# Patient Record
Sex: FEMALE | Race: BLACK OR AFRICAN AMERICAN | NOT HISPANIC OR LATINO | Employment: UNEMPLOYED | URBAN - METROPOLITAN AREA
[De-identification: names, ages, dates, MRNs, and addresses within clinical notes are randomized per-mention and may not be internally consistent; named-entity substitution may affect disease eponyms.]

---

## 2017-02-20 DIAGNOSIS — M54.50 CHRONIC LEFT-SIDED LOW BACK PAIN WITHOUT SCIATICA: ICD-10-CM

## 2017-02-20 DIAGNOSIS — G89.29 CHRONIC LEFT-SIDED LOW BACK PAIN WITHOUT SCIATICA: ICD-10-CM

## 2017-02-21 RX ORDER — BACLOFEN 10 MG/1
5-10 TABLET ORAL 3 TIMES DAILY
Qty: 90 TABLET | Refills: 1 | Status: SHIPPED | OUTPATIENT
Start: 2017-02-21 | End: 2017-03-22

## 2017-03-22 DIAGNOSIS — G89.29 CHRONIC LEFT-SIDED LOW BACK PAIN WITHOUT SCIATICA: ICD-10-CM

## 2017-03-22 DIAGNOSIS — M54.50 CHRONIC LEFT-SIDED LOW BACK PAIN WITHOUT SCIATICA: ICD-10-CM

## 2017-03-22 RX ORDER — BACLOFEN 10 MG/1
5-10 TABLET ORAL 3 TIMES DAILY
Qty: 90 TABLET | Refills: 1 | Status: SHIPPED | OUTPATIENT
Start: 2017-03-22 | End: 2020-01-14

## 2017-03-31 ENCOUNTER — TELEPHONE (OUTPATIENT)
Dept: FAMILY MEDICINE | Facility: CLINIC | Age: 67
End: 2017-03-31

## 2017-03-31 NOTE — TELEPHONE ENCOUNTER
[facility] phone call message- general phone call:    Reason for call: Pt stopped by she stated that her  sent over some faxes to her Dr and has not heard anything back yet need a someone to call her back the  is from [facility] her Name is Paula Saenz at  pt is facing eviction and needs paperwork or statement from Dr please call pt with any questions     Return call needed: Yes    OK to leave a message on voice mail? Yes    Primary language: English      needed? No    Call taken on March 31, 2017 at 12:00 PM by Nimisha De La Garza mp

## 2017-04-06 ENCOUNTER — OFFICE VISIT (OUTPATIENT)
Dept: FAMILY MEDICINE | Facility: CLINIC | Age: 67
End: 2017-04-06

## 2017-04-06 VITALS
WEIGHT: 193.2 LBS | TEMPERATURE: 98.2 F | SYSTOLIC BLOOD PRESSURE: 139 MMHG | DIASTOLIC BLOOD PRESSURE: 82 MMHG | HEART RATE: 76 BPM | BODY MASS INDEX: 30.26 KG/M2

## 2017-04-06 DIAGNOSIS — Z59.9 HOUSING OR ECONOMIC CIRCUMSTANCE: Primary | ICD-10-CM

## 2017-04-06 SDOH — ECONOMIC STABILITY - INCOME SECURITY: PROBLEM RELATED TO HOUSING AND ECONOMIC CIRCUMSTANCES, UNSPECIFIED: Z59.9

## 2017-04-06 NOTE — NURSING NOTE
Form Documentation (See below for information on what forms need appointments)    Information to be gathered from patient at time form was received:    Date form was received: 4/6/2017  : [place]  Type of form: Other: [employer] Payee  Who needs to complete form: Dr. Lewis  Preferred due date: today  Do we have an up to date number to contact the patient? Yes  Is there anyone else who we can contact with questions about the form? Yes  Is patient's portion filled out, including signature? YES  What should be done once the form is completed? Other: Give to patient today.     Message to be conveyed to patient: It may take your provider up to 10 days to complete your form. You may contact the clinic if you have concerns after 10 days.  _________________________________________________________________    Clinic process documentation (Please update this documentation at each handoff and date each update, i.e.: 6/3/16-Requested records from [facility]. Will complete form once these are received. TW):    Current status of form: Complete  Date form completed/sent: 04/06/2017  Was completed form routed to medical records? Yes:  Date 04/06/2017.    Status update: Complete  _________________________________________________________________    What forms need appointments?  If you are not sure whether or not an appointment is needed, consult with triage.    Forms that need a visit:  Citizenship (N-[id number])  FMLA (No visit needed if there was a visit within the last month for the leave reason)  Medical Opinion  [employer]  Social Security    No visit needed if there has been a physical within the last year:  (Forms do not need to be filled out by a doctor)    School Physical    May not need a visit (per providers discretion):  Handicap Parking  Transportation Evaluation  WIC (occasionally a visit may be needed)    Triage  [employer]

## 2017-04-06 NOTE — MR AVS SNAPSHOT
After Visit Summary   2017    Demetra Amin    MRN: [id number]           Patient Information     Date Of Birth          1950        Visit Information        Provider Department      2017 11:20 AM Ward Lewis DO [facility]        Today's Diagnoses     Housing or economic circumstance    -  1       Follow-ups after your visit        Follow-up notes from your care team     Return in about 1 year (around 2018) for Routine Visit.      Who to contact     Please call your clinic at [phone] to:    Ask questions about your health    Make or cancel appointments    Discuss your medicines    Learn about your test results    Speak to your doctor   If you have compliments or concerns about an experience at your clinic, or if you wish to file a complaint, please contact [facility] Patient Relations at [phone] or email us at [email or web address]         Additional Information About Your Visit        MyChart Information     [employer] is an electronic gateway that provides easy, online access to your medical records. With [employer], you can request a clinic appointment, read your test results, renew a prescription or communicate with your care team.     To sign up for [employer]ixt visit the website at [email or web address]   You will be asked to enter the access code listed below, as well as some personal information. Please follow the directions to create your username and password.     Your access code is: [email or web address]  Expires: 2017  7:15 AM     Your access code will  in 90 days. If you need help or a new code, please contact your [facility] or call [phone] for assistance.        Care EveryWhere ID     This is your Care EveryWhere ID. This could be used by other organizations to access your [place] medical records  [id number]        Your Vitals Were     Pulse Temperature BMI (Body Mass  Index)             76 98.2  F (36.8  C) (Oral) 30.26 kg/m2          Blood Pressure from Last 3 Encounters:   04/06/17 139/82   10/03/16 136/83   08/23/16 138/83    Weight from Last 3 Encounters:   04/06/17 193 lb 3.2 oz (87.6 kg)   10/03/16 195 lb 9.6 oz (88.7 kg)   08/23/16 186 lb 9.6 oz (84.6 kg)              Today, you had the following     No orders found for display       Primary Care Provider Office Phone # Fax #    Doe Jackson [phone]       [address] [address]        Thank you!     Thank you for choosing [facility]  for your care. Our goal is always to provide you with excellent care. Hearing back from our patients is one way we can continue to improve our services. Please take a few minutes to complete the written survey that you may receive in the mail after your visit with us. Thank you!             Your Updated Medication List - Protect others around you: Learn how to safely use, store and throw away your medicines at www.disposemymeds.org.          This list is accurate as of: 4/6/17 11:59 PM.  Always use your most recent med list.                   Brand Name Dispense Instructions for use    acetaminophen 500 MG tablet    TYLENOL    100 tablet    Take 1-2 tablets (500-1,000 mg) by mouth 3 times daily as needed for pain Do not take more than 6 tablets in a day.  Do not take with other medications than contain acetaminophen       albuterol 108 (90 BASE) MCG/ACT Inhaler    PROAIR HFA/PROVENTIL HFA/VENTOLIN HFA    1 Inhaler    Inhale 2 puffs into the lungs every 6 hours as needed for shortness of breath / dyspnea or wheezing       alum & mag hydroxide-simethicone 400-400-40 MG/5ML Susp suspension    MAALOX ADVANCED MAX ST    1 Bottle    Take 30 mLs by mouth every 4 hours as needed for indigestion       baclofen 10 MG tablet    LIORESAL    90 tablet    Take 0.5-1 tablets (5-10 mg) by mouth 3 times daily       calcium carbonate 500 MG chewable  tablet    TUMS    90 chew tab    Take 1 tablet (500 mg) by mouth daily       DAILY MULTIPLE VITAMINS Tabs     100 tablet    Take 1 tablet by mouth daily       lisinopril-hydrochlorothiazide 20-12.5 MG per tablet    PRINZIDE/ZESTORETIC    180 tablet    Take 2 tablets by mouth daily       methadone 10 MG/ML (HIGH CONC) solution    DOLOPHINE-INTENSOL     Dosed by methadone clinic       psyllium 63 % Powd    METAMUCIL SMOOTH TEXTURE    1 Bottle    Take 3 teaspoonful by mouth 3 times daily Mix in 8 ounces of water       ranitidine 150 MG tablet    ZANTAC    180 tablet    Take 1 tablet (150 mg) by mouth 2 times daily as needed for heartburn       salmeterol 50 MCG/DOSE diskus inhaler    SEREVENT DISKUS    3 Inhaler    Inhale 1 puff (50 mcg) into the lungs 2 times daily

## 2017-04-06 NOTE — PROGRESS NOTES
Subjective:  Demetra Amin is a 66 year old female with a history of     Patient Active Problem List    Diagnosis Date Noted     S/P total hip arthroplasty 02/17/2015     Priority: Medium     History of alcohol abuse 08/23/2016     S/p treatment in July 2016       Polysubstance abuse 03/27/2014 5/2013 UDS positive for cocaine, THC, methadone    3/31/15: Received [facility] notice of pt receiving opioid analgesics from 5+ physicians. Has a history of polysubstance abuse. Will scan prescription record into her chart, be aware of this when prescribing medication for pain in the future.         Chronic hepatitis C (H) 05/31/2013     Emeli type 1b in 2008       Essential hypertension 11/19/2012     human herpesvirus infection 11/19/2012     Primary localized osteoarthrosis, pelvic region and thigh 11/19/2012     Health Care Home 11/19/2012     Tier Level: 1    DX V65.8 REPLACED WITH 72723 HEALTH CARE HOME (04/08/2013)       States that she is currently on public housing. Is currently being seen by a  and needs to have a form completed that she has the ability to manage her own benefits.     She has help from her daughter who helps her to manager he bills. Daughter is her payee currently and  states that she should be transitioned to a state payee in order to not be evicted from her current housing location.     Past use of IV drugs but endorses that she has been on methadone for the past 15 years.      She manages all of her own medications while at home.     ROS: No fever or chills. No nausea of vomiting.     Objective:  Vitals: /82 (BP Location: Left arm, Patient Position: Chair, Cuff Size: Adult Large)  Pulse 76  Temp 98.2  F (36.8  C) (Oral)  Wt 193 lb 3.2 oz (87.6 kg)  BMI 30.26 kg/m2  General: Well-nourished. Alert and cooperative. No apparent distress.  Cardiovascular: Regular rate and rhythm. Normal S1 and S2. No murmurs  Respiratory: Clear to auscultation bilaterally. No  crackles or wheezes.   Psychiatric: Oriented to person, place, and time. Appropriate mood and affect.  Mentation appears normal.    Assessment:  Demetra Amin is a 66 year old female seen today for form completion.       Plan:    Housing or economic circumstance: Forms were completed in clinic today a copy of this will be placed in her chart. She was also sent home with a copy of her letter from Dr. Jackson.     Patient was discussed with and seen by Dr. Oropeza.    Ward Lewis PGY3

## 2017-04-06 NOTE — PROGRESS NOTES
Preceptor attestation:  Patient seen and discussed with the resident. Assessment and plan reviewed with resident and agreed upon.  Supervising physician: Olman Oropeza  [facility]

## 2017-04-14 ENCOUNTER — OFFICE VISIT (OUTPATIENT)
Dept: FAMILY MEDICINE | Facility: CLINIC | Age: 67
End: 2017-04-14

## 2017-04-14 VITALS
WEIGHT: 194.2 LBS | BODY MASS INDEX: 30.48 KG/M2 | TEMPERATURE: 98.2 F | SYSTOLIC BLOOD PRESSURE: 160 MMHG | DIASTOLIC BLOOD PRESSURE: 98 MMHG | HEART RATE: 84 BPM | HEIGHT: 67 IN

## 2017-04-14 DIAGNOSIS — M15.0 PRIMARY OSTEOARTHRITIS INVOLVING MULTIPLE JOINTS: Primary | ICD-10-CM

## 2017-04-14 NOTE — PROGRESS NOTES
"There are no exam notes on file for this visit.  Chief Complaint   Patient presents with     Forms     transportation forms     Blood pressure (!) 160/98, pulse 84, temperature 98.2  F (36.8  C), temperature source Oral, height 5' 7\" (170.2 cm), weight 194 lb 3.2 oz (88.1 kg), not currently breastfeeding.  SUBJECTIVE:  Patient came in today for me to complete a form for transportation evaluation.     Patient stated that she otherwise is doing well and she had no other concerns or complaints.     Patient and I discussed her need for specialized transportation.  Patient told me that she had OA of multiple sites, including the hips and knees.  Although she had replacement of several of these joints, they still swelled and were quite painful.  Additionally, she had lower back pain which caused her to have quite a bit of difficulty with walking.     She hadn't had recent hospitalization.  She lives alone.  She didn't have steps at her residence and instead has an elevator.  Patient told me that she cam only ambulate at maximum one-half block.  She uses a cane for ambulation.  She notes that she gets increased pain with cold weather and that she had increased pain over the past year.  She told me that she doesn't have problems with cognitive abilities.  She told me that she is able to remove herself from an unsafe situation, she is able to communicate her needs well, and she doesn't have problems with vision or hearing.     Patient's form was completed for her with these answers; please see scanned copy of the form for further details.   This was a 16-minute visit, over half of which was spent in counseling and coordination of care, and completing the transportation evaluation form.     Patient's blood pressure was elevated today.  She told me that she believed this was due to the stress of having this form completed, and she wasn't interested in looking into this further. We discussed the importance of treating " hypertension and the consequences of untreated htn.    Primary osteoarthritis involving multiple joints  The patient was actively involved in the decision making process, and all the questions were answered to their satisfaction prior to leaving.

## 2017-04-14 NOTE — MR AVS SNAPSHOT
"              After Visit Summary   2017    Demetra Amin    MRN: [id number]           Patient Information     Date Of Birth          1950        Visit Information        Provider Department      2017 10:40 AM Doe Jackson MD [facility]        Today's Diagnoses     Primary osteoarthritis involving multiple joints    -  1       Follow-ups after your visit        Who to contact     Please call your clinic at [phone] to:    Ask questions about your health    Make or cancel appointments    Discuss your medicines    Learn about your test results    Speak to your doctor   If you have compliments or concerns about an experience at your clinic, or if you wish to file a complaint, please contact [facility] Patient Relations at [phone] or email us at [email or web address]         Additional Information About Your Visit        MyChart Information     [employer] is an electronic gateway that provides easy, online access to your medical records. With [employer], you can request a clinic appointment, read your test results, renew a prescription or communicate with your care team.     To sign up for [employer] visit the website at [email or web address]   You will be asked to enter the access code listed below, as well as some personal information. Please follow the directions to create your username and password.     Your access code is: [email or web address]  Expires: 2017  7:15 AM     Your access code will  in 90 days. If you need help or a new code, please contact your [facility] or call [phone] for assistance.        Care EveryWhere ID     This is your Care EveryWhere ID. This could be used by other organizations to access your [place] medical records  [id number]        Your Vitals Were     Pulse Temperature Height BMI (Body Mass Index)          84 98.2  F (36.8  C) (Oral) 5' 7\" (170.2 cm) 30.42 kg/m2         " Blood Pressure from Last 3 Encounters:   04/14/17 (!) 160/98   04/06/17 139/82   10/03/16 136/83    Weight from Last 3 Encounters:   04/14/17 194 lb 3.2 oz (88.1 kg)   04/06/17 193 lb 3.2 oz (87.6 kg)   10/03/16 195 lb 9.6 oz (88.7 kg)              Today, you had the following     No orders found for display       Primary Care Provider Office Phone # Fax #    Doe Jackson [phone]       [address] [address]        Thank you!     Thank you for choosing [facility]  for your care. Our goal is always to provide you with excellent care. Hearing back from our patients is one way we can continue to improve our services. Please take a few minutes to complete the written survey that you may receive in the mail after your visit with us. Thank you!             Your Updated Medication List - Protect others around you: Learn how to safely use, store and throw away your medicines at www.disposemymeds.org.          This list is accurate as of: 4/14/17 11:59 PM.  Always use your most recent med list.                   Brand Name Dispense Instructions for use    acetaminophen 500 MG tablet    TYLENOL    100 tablet    Take 1-2 tablets (500-1,000 mg) by mouth 3 times daily as needed for pain Do not take more than 6 tablets in a day.  Do not take with other medications than contain acetaminophen       albuterol 108 (90 BASE) MCG/ACT Inhaler    PROAIR HFA/PROVENTIL HFA/VENTOLIN HFA    1 Inhaler    Inhale 2 puffs into the lungs every 6 hours as needed for shortness of breath / dyspnea or wheezing       alum & mag hydroxide-simethicone 400-400-40 MG/5ML Susp suspension    MAALOX ADVANCED MAX ST    1 Bottle    Take 30 mLs by mouth every 4 hours as needed for indigestion       baclofen 10 MG tablet    LIORESAL    90 tablet    Take 0.5-1 tablets (5-10 mg) by mouth 3 times daily       calcium carbonate 500 MG chewable tablet    TUMS    90 chew tab    Take 1 tablet (500 mg) by  mouth daily       DAILY MULTIPLE VITAMINS Tabs     100 tablet    Take 1 tablet by mouth daily       lisinopril-hydrochlorothiazide 20-12.5 MG per tablet    PRINZIDE/ZESTORETIC    180 tablet    Take 2 tablets by mouth daily       methadone 10 MG/ML (HIGH CONC) solution    DOLOPHINE-INTENSOL     Dosed by methadone clinic       psyllium 63 % Powd    METAMUCIL SMOOTH TEXTURE    1 Bottle    Take 3 teaspoonful by mouth 3 times daily Mix in 8 ounces of water       ranitidine 150 MG tablet    ZANTAC    180 tablet    Take 1 tablet (150 mg) by mouth 2 times daily as needed for heartburn       salmeterol 50 MCG/DOSE diskus inhaler    SEREVENT DISKUS    3 Inhaler    Inhale 1 puff (50 mcg) into the lungs 2 times daily

## 2017-04-21 ENCOUNTER — TELEPHONE (OUTPATIENT)
Dept: FAMILY MEDICINE | Facility: CLINIC | Age: 67
End: 2017-04-21

## 2017-04-21 NOTE — TELEPHONE ENCOUNTER
I have not done any neuro testing on this patient so would not be able to speak to her ability to manage her own finances. Sadi Lewis

## 2017-04-21 NOTE — TELEPHONE ENCOUNTER
[facility] phone call message- general phone call:    Reason for call: Has questions regarding neurological testing and patient.    Return call needed: Yes    OK to leave a message on voice mail? Yes    Primary language: English      needed? No    Call taken on April 21, 2017 at 1:37 PM by Maribel Miller

## 2017-04-21 NOTE — TELEPHONE ENCOUNTER
Gudelia, , calling to get more info from Dr. Lewis. Pt is a defendant in an eviction process    Dr. Lweis, based upon knowledge of neuro testing, would you say pt has a disability or impairment that would effect  her ability to manage her finances or pay her rent on time?    Gudelia's cell:  [phone]. Gudelia has a mtg at 3:30 but it's okay to leave vm (it's confidential vm)     Routed to Dr. Lewis. /TOMAS George

## 2017-08-15 DIAGNOSIS — I10 ESSENTIAL HYPERTENSION, BENIGN: ICD-10-CM

## 2017-08-15 RX ORDER — LISINOPRIL AND HYDROCHLOROTHIAZIDE 12.5; 2 MG/1; MG/1
2 TABLET ORAL DAILY
Qty: 180 TABLET | Refills: 3 | Status: SHIPPED | OUTPATIENT
Start: 2017-08-15 | End: 2018-06-13

## 2018-03-05 ENCOUNTER — OFFICE VISIT (OUTPATIENT)
Dept: FAMILY MEDICINE | Facility: CLINIC | Age: 68
End: 2018-03-05
Payer: MEDICARE

## 2018-03-05 ENCOUNTER — RECORDS - HEALTHEAST (OUTPATIENT)
Dept: ADMINISTRATIVE | Facility: OTHER | Age: 68
End: 2018-03-05

## 2018-03-05 VITALS
HEIGHT: 66 IN | SYSTOLIC BLOOD PRESSURE: 190 MMHG | HEART RATE: 64 BPM | OXYGEN SATURATION: 97 % | WEIGHT: 177.8 LBS | BODY MASS INDEX: 28.57 KG/M2 | DIASTOLIC BLOOD PRESSURE: 95 MMHG | TEMPERATURE: 98.8 F

## 2018-03-05 DIAGNOSIS — B35.1 ONYCHOMYCOSIS: ICD-10-CM

## 2018-03-05 DIAGNOSIS — M54.50 CHRONIC BILATERAL LOW BACK PAIN WITHOUT SCIATICA: Primary | ICD-10-CM

## 2018-03-05 DIAGNOSIS — B18.2 CHRONIC HEPATITIS C WITHOUT HEPATIC COMA (H): ICD-10-CM

## 2018-03-05 DIAGNOSIS — Z12.31 ENCOUNTER FOR SCREENING MAMMOGRAM FOR BREAST CANCER: ICD-10-CM

## 2018-03-05 DIAGNOSIS — Z23 NEED FOR VACCINATION: ICD-10-CM

## 2018-03-05 DIAGNOSIS — G89.29 CHRONIC BILATERAL LOW BACK PAIN WITHOUT SCIATICA: Primary | ICD-10-CM

## 2018-03-05 DIAGNOSIS — I10 ESSENTIAL HYPERTENSION, BENIGN: ICD-10-CM

## 2018-03-05 LAB
ALBUMIN SERPL-MCNC: 4 MG/DL (ref 3.3–4.9)
ALP SERPL-CCNC: 87.8 U/L (ref 40–150)
ALT SERPL-CCNC: 17 U/L (ref 0–45)
AST SERPL-CCNC: 20.4 U/L (ref 0–45)
BASOPHILS # BLD AUTO: 0 THOU/UL (ref 0–0.2)
BASOPHILS NFR BLD AUTO: 0 % (ref 0–2)
BILIRUB SERPL-MCNC: 0.4 MG/DL (ref 0.2–1.3)
BUN SERPL-MCNC: 15.8 MG/DL (ref 7–19)
CALCIUM SERPL-MCNC: 9.8 MG/DL (ref 8.5–10.1)
CHLORIDE SERPLBLD-SCNC: 103.3 MMOL/L (ref 98–110)
CO2 SERPL-SCNC: 29 MMOL/L (ref 20–32)
CREAT SERPL-MCNC: 0.8 MG/DL (ref 0.5–1)
EOSINOPHIL # BLD AUTO: 0.1 THOU/UL (ref 0–0.4)
EOSINOPHIL NFR BLD AUTO: 1 % (ref 0–6)
ERYTHROCYTE [DISTWIDTH] IN BLOOD BY AUTOMATED COUNT: 13.1 % (ref 11–14.5)
GFR SERPL CREATININE-BSD FRML MDRD: 76.5 ML/MIN/1.7 M2
GLUCOSE SERPL-MCNC: 88.1 MG'DL (ref 70–99)
HCT VFR BLD AUTO: 42.2 % (ref 35–47)
HGB BLD-MCNC: 13 G/DL (ref 12–16)
LYMPHOCYTES # BLD AUTO: 1.9 THOU/UL (ref 0.8–4.4)
LYMPHOCYTES NFR BLD AUTO: 28 % (ref 20–40)
MCH RBC QN AUTO: 28.4 PG (ref 27–34)
MCHC RBC AUTO-ENTMCNC: 30.8 G/DL (ref 32–36)
MCV RBC AUTO: 92 FL (ref 80–100)
MONOCYTES # BLD AUTO: 0.5 THOU/UL (ref 0–0.9)
MONOCYTES NFR BLD AUTO: 7 % (ref 2–10)
NEUTROPHILS # BLD AUTO: 4.2 THOU/UL (ref 2–7.7)
NEUTROPHILS NFR BLD AUTO: 64 % (ref 50–70)
PLATELET # BLD AUTO: 194 THOU/UL (ref 140–440)
PMV BLD AUTO: 12.7 FL (ref 8.5–12.5)
POTASSIUM SERPL-SCNC: 4.8 MMOL/DL (ref 3.2–4.6)
PROT SERPL-MCNC: 7.5 G/DL (ref 6.8–8.8)
RBC # BLD AUTO: 4.58 MILL/UL (ref 3.8–5.4)
SODIUM SERPL-SCNC: 137.8 MMOL/L (ref 132–142)
WBC # BLD AUTO: 6.7 THOU/UL (ref 4–11)

## 2018-03-05 NOTE — PATIENT INSTRUCTIONS
Thank you for coming to [facility].  **If you had lab testing today and your results are reassuring or normal they will be be mailed to you within 7 days.   **If the lab tests need quick action we will call you with the results.  The phone number we will call with results is # [phone] (home) . If this is not the best number please call our clinic and change the number.  If you need any refills please call your pharmacy and they will contact us.  If you have any concerns about today's visit or wish to schedule another appointment please call our office during normal business hours [phone] (8-5:00 M-F)  If you have urgent medical concerns please call [phone] at any time of the day.  If you a medical emergency please call [phone]  Again thank you for choosing [facility] and please let us know how we can best partner with you to improve you and your family's health.          [facility]  Radiology Department 1st floor  [address] [id number] [phone]    Appointment for Mammogram and Ultasound  Date:3/8/18  Time:8:45am arrival    The day of your mammogram please refrain from using lotions, powders, or perfumes in the area as this could interfere with the imaging.     *Nothing to eat or drink for 8 hours prior to ultrasound.     [facility]    [address] [place] [address]  Hours: Open   Closes 5PM  Phone: [phone]    Appointment  Date: 4/5/18  Time: 8:20am   Dr. Beebe       Please contact the above clinic if you need to cancel or reschedule. Feel free to contact me with any questions. Thanks!    Griselda  Care Coordinator  [phone]        "[facility]   [address] [place] [address]  Hours: Open   Closes 5PM  Phone: [phone]  *Go to [employer] to get your cane. Bring your prescription with you.     GASTROENOLOGY:  Orders and demographics faxed to BENJ[facility] Orosco and they will contact you for scheduling  within 2-5 business days.   PH:  [phone]   FAX:  [phone]  Viky Pack  3/8/18

## 2018-03-05 NOTE — LETTER
March 5, 2018      Demetra LEAHY [facility]  [address]   [place] [address]        Dear Demetra,  Your liver and kidney tests are good.  Your kidney tests have improved since one year ago   Please see below for your test results.    Resulted Orders   Comprehensive Metabolic Panel ([place])   Result Value Ref Range    Albumin 4.0 3.3 - 4.9 mg/dL    Alkaline Phosphatase 87.8 40.0 - 150.0 U/L    ALT 17.0 0.0 - 45.0 U/L    AST 20.4 0.0 - 45.0 U/L    Bilirubin Total 0.4 0.2 - 1.3 mg/dL    Urea Nitrogen 15.8 7.0 - 19.0 mg/dL    Calcium 9.8 8.5 - 10.1 mg/dL    Chloride 103.3 98.0 - 110.0 mmol/L    Carbon Dioxide 29.0 20.0 - 32.0 mmol/L    Creatinine 0.8 0.5 - 1.0 mg/dL    Glucose 88.1 70.0 - 99.0 mg'dL    Potassium 4.8 (H) 3.2 - 4.6 mmol/dL    Sodium 137.8 132.0 - 142.0 mmol/L    Protein Total 7.5 6.8 - 8.8 g/dL    GFR Estimate 76.5 >60.0 mL/min/1.7 m2    GFR Estimate If Black >90 >60.0 mL/min/1.7 m2       If you have any questions, please call the clinic to make an appointment.    Sincerely,    Doe Jackson MD

## 2018-03-05 NOTE — PROGRESS NOTES
"There are no exam notes on file for this visit.  Chief Complaint   Patient presents with     Back Pain     come here today for back pain and pain medication per patient.      Referral     want referral for mammogram per patient.      Blood pressure (!) 190/95, pulse 64, temperature 98.8  F (37.1  C), temperature source Oral, height 5' 6\" (167.6 cm), weight 177 lb 12.8 oz (80.6 kg), SpO2 97 %, not currently breastfeeding.    Patient comes in today with several assorted complaints.    The patient tells me that her daughter, who lives in [place], was recently hospitalized.  She told me that she received a call on 2017 to help identify her daughter.  She did go to [place] to be with her daughter and tells me that seeing her daughter with all the medical tubes and machines was really quite traumatic for her.  Her daughter is 44 years old.  The daughter apparently has survived, and is now discharged from the hospital.  The patient tells me that she has been back from [place] over the past 1-2 weeks.    The patient notes that she has quite a bit of low back pain.  She is not able to walk 2 blocks to the bus stop.  She notes that as she walks this distance she has to stop several times due to the pain.  She also notes that she has anxiety and depression.    The patient reminds me that she has had quite a bit of osteoarthritis and has had both knees and hips replaced.    The patient tells me that she \"thinks too much\" and that she has some difficulty sleeping.  She does not feel that she can relax.     The patient tells me that she usually \"likes to handle things myself\".    The patient also tells me that her daughter's mother-in-law recently  of cancer of the brain after being on hospice and this has been quite stressful for the family to    The patient notes that she used to be on 160 mg of methadone.  Because she missed some time due to her daughter's illness they decreased her down significantly, and she " is now titrating up by 10 mg every 3 days and is currently up to 82 mg per day.    I would like the patient see a counselor.  The patient tells me that there is a counselor at the methadone clinic and that she has a good relationship with that person and she does not want to see anybody else.    The patient notes that she is unable to stay asleep after awakening early in the morning.  She attributes this to withdrawal symptoms.    The patient tells me she did not take her blood pressure medication this morning.  She has plenty of the blood pressure medicine at home, she just forgot to take it this morning.  She had tells me that she has over 2 bottles of blood pressure medicine at home.    Objective: This is a well-nourished, well-developed, female who is in no acute distress.  Her vital signs are as noted above and reveal a very elevated blood pressure.  Examination of her feet show changes consistent with onychomycosis across the nails of both of her feet.  She would like to see a podiatrist.    Assessment: #1 chronic bilateral low back pain without sciatica #2 need for screening mammography #3 onychomycosis #4 essential hypertension under very poor control #5 chronic hepatitis C    Plan: It looks to me that the patient has not had lab work for hepatitis C recently.  She agrees to have lab work today.  Therefore we will check a RNA quantitative.  We will also check an ultrasound to screen for HCC.  Will check a CBC with platelets for cirrhosis.  If the patient has a significant titer of hepatitis C virus, will send the patient to discuss therapy at gastroenterology.    I am happy to provide a referral for podiatry.    I would like the patient follow-up with me in 1 week to recheck her blood pressure pressure after she has been back on her medication.  She agrees to do so.  Please see orders below.    I discussed with the patient that I think that some part of her back discomfort could be that she has not titrated  back up to her usual dose of methadone.  We will watch and see how her pain does as she titrates back up on the methadone.  She would like a cane, I am happy to provide this for her.      Chronic bilateral low back pain without sciatica  -     order for DME; Equipment being ordered: Cane    Encounter for screening mammogram for breast cancer  -     PCS Use: Screening Digital Bilateral Mammogram; Future    Onychomycosis  -     PODIATRY/FOOT & ANKLE SURGERY REFERRAL; Future    Essential hypertension    Chronic hepatitis C without hepatic coma (H)  -     Cancel: Hepatitis C RNA quantitative  -     Comprehensive Metabolic Panel ([place])  -     US ABDOMEN COMPLETE; Future  -     CBC w/ Diff and Plt ([facility])  -     Hepatits C RNA by RT-PCR ([facility])    Need for vaccination  -     ADMIN VACCINE, INITIAL  -     Pneumococcal vaccine 13 valent PCV13 IM (Prevnar) [[id number]]    We spent 31 minutes during this visit face to face.  Over half of this time was spent in counseling and coordination of care with discussion of the above items.    The patient was actively involved in the decision making process, and all the questions were answered to their satisfaction prior to leaving.

## 2018-03-05 NOTE — LETTER
March 6, 2018      Demetra L [facility]  [address]   [place] [address]        Dear Demetra,    Please see below for your test results.   Your complete blood count results are good     Resulted Orders   Comprehensive Metabolic Panel ([place])   Result Value Ref Range    Albumin 4.0 3.3 - 4.9 mg/dL    Alkaline Phosphatase 87.8 40.0 - 150.0 U/L    ALT 17.0 0.0 - 45.0 U/L    AST 20.4 0.0 - 45.0 U/L    Bilirubin Total 0.4 0.2 - 1.3 mg/dL    Urea Nitrogen 15.8 7.0 - 19.0 mg/dL    Calcium 9.8 8.5 - 10.1 mg/dL    Chloride 103.3 98.0 - 110.0 mmol/L    Carbon Dioxide 29.0 20.0 - 32.0 mmol/L    Creatinine 0.8 0.5 - 1.0 mg/dL    Glucose 88.1 70.0 - 99.0 mg'dL    Potassium 4.8 (H) 3.2 - 4.6 mmol/dL    Sodium 137.8 132.0 - 142.0 mmol/L    Protein Total 7.5 6.8 - 8.8 g/dL    GFR Estimate 76.5 >60.0 mL/min/1.7 m2    GFR Estimate If Black >90 >60.0 mL/min/1.7 m2   CBC w/ Diff and Plt ([facility])   Result Value Ref Range    WBC 6.7 4.0 - 11.0 thou/uL    RBC 4.58 3.80 - 5.40 mill/uL    Hemoglobin 13.0 12.0 - 16.0 g/dL    Hematocrit 42.2 35.0 - 47.0 %    MCV 92 80 - 100 fL    MCH 28.4 27.0 - 34.0 pg    MCHC 30.8 (L) 32.0 - 36.0 g/dL    RDW 13.1 11.0 - 14.5 %    Platelets 194 140 - 440 thou/uL    Mean Platelet Volume 12.7 (H) 8.5 - 12.5 fL    % Neutrophils 64 50 - 70 %    % Lymphocytes 28 20 - 40 %    % Monocytes 7 2 - 10 %    % Eosinophils 1 0 - 6 %    % Basophils 0 0 - 2 %    Neutrophils (Absolute) 4.2 2.0 - 7.7 thou/uL    Lymphs (Absolute) 1.9 0.8 - 4.4 thou/uL    Monocytes(Absolute) 0.5 0.0 - 0.9 thou/uL    Eos (Absolute) 0.1 0.0 - 0.4 thou/uL    Baso (Absolute) 0.0 0.0 - 0.2 thou/uL    Narrative    Test performed by:  [facility]  [address] [place] [address]       If you have any questions, please call the clinic to make an appointment.    Sincerely,    Doe Jackson MD

## 2018-03-05 NOTE — MR AVS SNAPSHOT
After Visit Summary   3/5/2018    Demetra Amin    MRN: [id number]           Patient Information     Date Of Birth          1950        Visit Information        Provider Department      3/5/2018 8:00 AM Doe Jackson MD [facility]        Today's Diagnoses     Encounter for screening mammogram for breast cancer    -  1    Onychomycosis        Essential hypertension        Chronic hepatitis C without hepatic coma (H)          Care Instructions    Thank you for coming to [facility].  **If you had lab testing today and your results are reassuring or normal they will be be mailed to you within 7 days.   **If the lab tests need quick action we will call you with the results.  The phone number we will call with results is # [phone] (home) . If this is not the best number please call our clinic and change the number.  If you need any refills please call your pharmacy and they will contact us.  If you have any concerns about today's visit or wish to schedule another appointment please call our office during normal business hours [phone] (8-5:00 M-F)  If you have urgent medical concerns please call [phone] at any time of the day.  If you a medical emergency please call [phone]  Again thank you for choosing [facility] and please let us know how we can best partner with you to improve you and your family's health.                    Follow-ups after your visit        Additional Services     PODIATRY/FOOT & ANKLE SURGERY REFERRAL       Patient to stop at the [facility] Desk    Reason for Referral: Onychomycosis     needed: No  Language: English    May leave message on voicemail: Yes    ([place] Only) Referral should be tracked (Yes/No)?                  Future tests that were ordered for you today     Open Future Orders        Priority Expected Expires Ordered    US ABDOMEN COMPLETE Routine  3/5/2019 3/5/2018    PODIATRY/FOOT & ANKLE SURGERY REFERRAL Routine  3/5/2019  "3/5/2018    PCS Use: Screening Digital Bilateral Mammogram Routine  3/5/2019 3/5/2018            Who to contact     Please call your clinic at [phone] to:    Ask questions about your health    Make or cancel appointments    Discuss your medicines    Learn about your test results    Speak to your doctor            Additional Information About Your Visit        MyChart Information     [employer] is an electronic gateway that provides easy, online access to your medical records. With [employer], you can request a clinic appointment, read your test results, renew a prescription or communicate with your care team.     To sign up for [employer] visit the website at [email or web address]   You will be asked to enter the access code listed below, as well as some personal information. Please follow the directions to create your username and password.     Your access code is: [email or web address]  Expires: 6/3/2018  8:32 AM     Your access code will  in 90 days. If you need help or a new code, please contact your [facility] or call [phone] for assistance.        Care EveryWhere ID     This is your Care EveryWhere ID. This could be used by other organizations to access your [facility] medical records  [id number]        Your Vitals Were     Pulse Temperature Height Pulse Oximetry BMI (Body Mass Index)       64 98.8  F (37.1  C) (Oral) 5' 6\" (167.6 cm) 97% 28.7 kg/m2        Blood Pressure from Last 3 Encounters:   18 (!) 190/95   17 (!) 160/98   17 139/82    Weight from Last 3 Encounters:   18 177 lb 12.8 oz (80.6 kg)   17 194 lb 3.2 oz (88.1 kg)   17 193 lb 3.2 oz (87.6 kg)              We Performed the Following     CBC w/ Diff and Plt ([facility])     Comprehensive Metabolic Panel ([place])     Hepatits C RNA by RT-PCR ([facility])        Primary Care Provider Office Phone # Fax #    Doe Jackson [phone]       [facility] " [address] [address]        Equal Access to Services     CATRACHITA CHAVEZ : Hadii aad ku hadnatashatrevor Sorubenali, waaxda luqadaha, qaybta kamonikacamille darnell, michelle samuelsthaodonis ogden. So [facility] [phone].    ATENCIÓN: Si habla español, tiene a castellon disposición servicios gratuitos de asistencia lingüística. Meliaame al [phone].    We comply with applicable federal civil rights laws and [place] laws. We do not discriminate on the basis of race, color, national origin, age, disability, sex, sexual orientation, or gender identity.            Thank you!     Thank you for choosing [facility]  for your care. Our goal is always to provide you with excellent care. Hearing back from our patients is one way we can continue to improve our services. Please take a few minutes to complete the written survey that you may receive in the mail after your visit with us. Thank you!             Your Updated Medication List - Protect others around you: Learn how to safely use, store and throw away your medicines at www.disposemymeds.org.          This list is accurate as of 3/5/18  8:32 AM.  Always use your most recent med list.                   Brand Name Dispense Instructions for use Diagnosis    acetaminophen 500 MG tablet    TYLENOL    100 tablet    Take 1-2 tablets (500-1,000 mg) by mouth 3 times daily as needed for pain Do not take more than 6 tablets in a day.  Do not take with other medications than contain acetaminophen    Closed nondisplaced comminuted fracture of shaft of right humerus with routine healing, subsequent encounter       albuterol 108 (90 BASE) MCG/ACT Inhaler    PROAIR HFA/PROVENTIL HFA/VENTOLIN HFA    1 Inhaler    Inhale 2 puffs into the lungs every 6 hours as needed for shortness of breath / dyspnea or wheezing    Chronic obstructive pulmonary disease, unspecified COPD type (H)       alum & mag hydroxide-simethicone 400-400-40 MG/5ML Susp suspension    MAALOX ADVANCED MAX ST    1  Bottle    Take 30 mLs by mouth every 4 hours as needed for indigestion    Gastrointestinal distress       baclofen 10 MG tablet    LIORESAL    90 tablet    Take 0.5-1 tablets (5-10 mg) by mouth 3 times daily    Chronic left-sided low back pain without sciatica       calcium carbonate 500 MG chewable tablet    TUMS    90 chew tab    Take 1 tablet (500 mg) by mouth daily    Esophageal reflux       DAILY MULTIPLE VITAMINS Tabs     100 tablet    Take 1 tablet by mouth daily    Health care maintenance       lisinopril-hydrochlorothiazide 20-12.5 MG per tablet    PRINZIDE/ZESTORETIC    180 tablet    Take 2 tablets by mouth daily    Essential hypertension, benign       methadone 10 MG/ML (HIGH CONC) solution    DOLOPHINE-INTENSOL     Dosed by methadone clinic        psyllium 63 % Powd    METAMUCIL SMOOTH TEXTURE    1 Bottle    Take 3 teaspoonful by mouth 3 times daily Mix in 8 ounces of water    Constipation       ranitidine 150 MG tablet    ZANTAC    180 tablet    Take 1 tablet (150 mg) by mouth 2 times daily as needed for heartburn    Gastroesophageal reflux disease without esophagitis       salmeterol 50 MCG/DOSE diskus inhaler    SEREVENT DISKUS    3 Inhaler    Inhale 1 puff (50 mcg) into the lungs 2 times daily    COPD (chronic obstructive pulmonary disease) (H)

## 2018-03-05 NOTE — LETTER
March 8, 2018      Demetra LEAHY [facility]  [address]   [place] [address]        Dear Demetra,    Please see below for your test results.  Your hepatitis C level is still high.  I think you should see a GI specialist to talk about treatment.  I have put in a referral for this.  You can come in to talk to me if you have questions or concerns     Resulted Orders   Comprehensive Metabolic Panel ([place])   Result Value Ref Range    Albumin 4.0 3.3 - 4.9 mg/dL    Alkaline Phosphatase 87.8 40.0 - 150.0 U/L    ALT 17.0 0.0 - 45.0 U/L    AST 20.4 0.0 - 45.0 U/L    Bilirubin Total 0.4 0.2 - 1.3 mg/dL    Urea Nitrogen 15.8 7.0 - 19.0 mg/dL    Calcium 9.8 8.5 - 10.1 mg/dL    Chloride 103.3 98.0 - 110.0 mmol/L    Carbon Dioxide 29.0 20.0 - 32.0 mmol/L    Creatinine 0.8 0.5 - 1.0 mg/dL    Glucose 88.1 70.0 - 99.0 mg'dL    Potassium 4.8 (H) 3.2 - 4.6 mmol/dL    Sodium 137.8 132.0 - 142.0 mmol/L    Protein Total 7.5 6.8 - 8.8 g/dL    GFR Estimate 76.5 >60.0 mL/min/1.7 m2    GFR Estimate If Black >90 >60.0 mL/min/1.7 m2   CBC w/ Diff and Plt ([facility])   Result Value Ref Range    WBC 6.7 4.0 - 11.0 thou/uL    RBC 4.58 3.80 - 5.40 mill/uL    Hemoglobin 13.0 12.0 - 16.0 g/dL    Hematocrit 42.2 35.0 - 47.0 %    MCV 92 80 - 100 fL    MCH 28.4 27.0 - 34.0 pg    MCHC 30.8 (L) 32.0 - 36.0 g/dL    RDW 13.1 11.0 - 14.5 %    Platelets 194 140 - 440 thou/uL    Mean Platelet Volume 12.7 (H) 8.5 - 12.5 fL    % Neutrophils 64 50 - 70 %    % Lymphocytes 28 20 - 40 %    % Monocytes 7 2 - 10 %    % Eosinophils 1 0 - 6 %    % Basophils 0 0 - 2 %    Neutrophils (Absolute) 4.2 2.0 - 7.7 thou/uL    Lymphs (Absolute) 1.9 0.8 - 4.4 thou/uL    Monocytes(Absolute) 0.5 0.0 - 0.9 thou/uL    Eos (Absolute) 0.1 0.0 - 0.4 thou/uL    Baso (Absolute) 0.0 0.0 - 0.2 thou/uL    Narrative    Test performed by:  [facility] [address] [place] [address]   Hepatits C RNA by RT-PCR ([facility])   Result Value Ref Range    HCV RNA  Detect/Quant, S [id number] (A) Undetected IU/mL      Comment:      Result in log IU/mL is 6.03.     -------------------ADDITIONAL INFORMATION-------------------  The quantification range of this assay is 15 to 100,000,000   IU/mL (1.18 log to 8.00 log IU/mL). Testing was performed   using the valencia HCV test ([employer]   with the valencia Tensilica0 System.     Test Performed by:  [facility]  [address] [id number]      Narrative    Test performed by:  [place] [id number]       If you have any questions, please call the clinic to make an appointment.    Sincerely,    Doe Jackson MD

## 2018-03-07 LAB — HCV RNA DETECT/QUANT, S: ABNORMAL IU/ML

## 2018-03-08 NOTE — PROGRESS NOTES
Your hepatitis C level is still high.  I think you should see a GI specialist to talk about treatment.  I have put in a referral for this.  You can come in to talk to me if you have questions or concerns

## 2018-03-14 ENCOUNTER — HOSPITAL ENCOUNTER (OUTPATIENT)
Dept: MAMMOGRAPHY | Facility: CLINIC | Age: 68
Discharge: HOME | End: 2018-03-14
Attending: FAMILY MEDICINE

## 2018-03-14 ENCOUNTER — HOSPITAL ENCOUNTER (OUTPATIENT)
Dept: ULTRASOUND IMAGING | Facility: CLINIC | Age: 68
Discharge: HOME | End: 2018-03-14
Attending: FAMILY MEDICINE

## 2018-03-14 DIAGNOSIS — B18.2 CHRONIC HEPATITIS C WITHOUT HEPATIC COMA (H): ICD-10-CM

## 2018-03-14 DIAGNOSIS — Z12.31 VISIT FOR SCREENING MAMMOGRAM: ICD-10-CM

## 2018-03-15 DIAGNOSIS — Z12.31 ENCOUNTER FOR SCREENING MAMMOGRAM FOR BREAST CANCER: ICD-10-CM

## 2018-03-15 DIAGNOSIS — B18.2 CHRONIC HEPATITIS C WITHOUT HEPATIC COMA (H): ICD-10-CM

## 2018-03-15 LAB — MAMMOGRAM: NORMAL

## 2018-03-15 NOTE — LETTER
March 16, 2018      Demetra LEAHY [facility]  [address]   [place] [address]        Dear Demetra,    Please see below for your test results.   Your abdominal ultrasound is normal     Resulted Orders   PCS Use: Screening Digital Bilateral Mammogram   Result Value Ref Range    MAMMOGRAM         If you have any questions, please call the clinic to make an appointment.    Sincerely,    Doe Jackson MD

## 2018-03-15 NOTE — LETTER
March 16, 2018      Demetra LEAHY [facility]  [address]   [place] [address]        Dear Demetra,    Please see below for your test results.  Your mammogram is normal     Resulted Orders   PCS Use: Screening Digital Bilateral Mammogram   Result Value Ref Range    MAMMOGRAM         If you have any questions, please call the clinic to make an appointment.    Sincerely,    Doe Jackson MD

## 2018-03-23 ENCOUNTER — OFFICE VISIT (OUTPATIENT)
Dept: FAMILY MEDICINE | Facility: CLINIC | Age: 68
End: 2018-03-23
Payer: MEDICARE

## 2018-03-23 VITALS
HEART RATE: 69 BPM | BODY MASS INDEX: 28.86 KG/M2 | DIASTOLIC BLOOD PRESSURE: 106 MMHG | SYSTOLIC BLOOD PRESSURE: 170 MMHG | TEMPERATURE: 98.7 F | OXYGEN SATURATION: 96 % | WEIGHT: 178.8 LBS

## 2018-03-23 DIAGNOSIS — F51.01 PRIMARY INSOMNIA: ICD-10-CM

## 2018-03-23 DIAGNOSIS — Z00.00 HEALTH CARE MAINTENANCE: ICD-10-CM

## 2018-03-23 DIAGNOSIS — B18.2 CHRONIC HEPATITIS C WITHOUT HEPATIC COMA (H): Primary | ICD-10-CM

## 2018-03-23 RX ORDER — LANOLIN ALCOHOL/MO/W.PET/CERES
3 CREAM (GRAM) TOPICAL
Qty: 100 TABLET | Refills: 3 | Status: SHIPPED | OUTPATIENT
Start: 2018-03-23 | End: 2018-09-10

## 2018-03-23 RX ORDER — MULTIVITAMIN
1 TABLET ORAL DAILY
Qty: 100 TABLET | Refills: 3 | Status: SHIPPED | OUTPATIENT
Start: 2018-03-23 | End: 2020-01-14

## 2018-03-23 NOTE — PROGRESS NOTES
There are no exam notes on file for this visit.  Chief Complaint   Patient presents with     Other     come here today to talk about Hepatitis C treatment per patient.      other     don't sleep good per patient.      Blood pressure (!) 170/106, pulse 69, temperature 98.7  F (37.1  C), temperature source Oral, weight 178 lb 12.8 oz (81.1 kg), SpO2 96 %, not currently breastfeeding.    Patient comes in with today with 2 concerns.    Firstly, the patient would like to discuss the results of her hepatitis C testing.  She tells me again today that she was initially told she had hepatitis C.  However, then she tells me that another provider told her that she had nothing to worry about with a hepatitis C, and they did not need further follow-up.    The patient also has concerns questions about how the LFTs can be normal but yet the viral load for hepatitis C be positive.    The patient is having some difficulty sleeping.  She previously has tried Xanax, and knows that she cannot be on that while she is on the methadone.  She wonders whether or not I could give her a prescription for Klonopin.    The patient has both difficulty falling asleep as well as maintaining sleep.  She is not having nightmares.  She otherwise believes that her affect is normal.      She did not take her blood pressure medication this morning.    Objective: This is a well-developed female who is in no acute distress.  Her vital signs are as noted above, and reveal an elevated blood pressure.  Laboratory results from the last visit were reviewed with the patient in thorough detail.    Assessment: #1 chronic hepatitis C without hepatic coma #2 insomnia #3 healthcare maintenance    Plan: The patient would like a refill of multiple vitamins.  This is provided for her.    The patient understands the rationale for treatment of her hepatitis C.  We discussed historical as well as current regimens for hepatitis C.  All of her questions are answered to her  satisfaction prior to her leaving.  She is referred to gastroenterology for evaluation as to whether or not she is a good candidate for treatment for her hepatitis C.    For the insomnia, we discussed sleep hygiene.  Please see AVS for further details.  Patient used melatonin which she is in treatment and had good results with this.  This is also provided for her.    For her elevated blood pressure, I have encouraged adherence with her medication regimen.  We will recheck this at the time of her next visit.    Chronic hepatitis C without hepatic coma (H)    Health care maintenance  -     DAILY MULTIPLE VITAMINS TABS; Take 1 tablet by mouth daily    Primary insomnia  -     melatonin 3 MG tablet; Take 1 tablet (3 mg) by mouth nightly as needed for sleep    The patient was actively involved in the decision making process, and all the questions were answered to their satisfaction prior to leaving.

## 2018-03-23 NOTE — PATIENT INSTRUCTIONS
"Stop at the  to get the appointment with the liver doctor    Some advice for sleep:    Getting up at the same time each morning (even Weekends).  Do not spend more than 20 minutes in bed without sleeping.  If you don't fall asleep in 20 minutes, engage in a relaxing activity (like reading) in a dimly lit room.  Avoid bright lights for 1-2 hours before bedtime  Only sleep and engage in marital activities in the bed.  Avoid naps during the day, and increase your activity level  Avoid excess alcohol or over the counter sleeping medicine  Do not smoke--if you must smoke do so after dinner and not before bed  Get regular exercise--but do not do it within 2 hours of bedtime  Take a warm shower or bath 30 minutes before bedtime    Consider reading Dr Benigno Barrera's \"No More Sleepless Nights\"    GASTRO REFERRAL:  Orders and demographics faxed to [facility] and they will contact you for scheduling within 2-5 business days.   PH:  [phone]   FAX:  [phone]  Viky Pack  3/23/18  "

## 2018-03-23 NOTE — MR AVS SNAPSHOT
"              After Visit Summary   3/23/2018    Demetra Amin    MRN: [id number]           Patient Information     Date Of Birth          1950        Visit Information        Provider Department      3/23/2018 10:20 AM Doe Jackson MD [facility]        Today's Diagnoses     Chronic hepatitis C without hepatic coma (H)    -  1    Health care maintenance        Primary insomnia          Care Instructions    Stop at the  to get the appointment with the liver doctor    Some advice for sleep:    Getting up at the same time each morning (even Weekends).  Do not spend more than 20 minutes in bed without sleeping.  If you don't fall asleep in 20 minutes, engage in a relaxing activity (like reading) in a dimly lit room.  Avoid bright lights for 1-2 hours before bedtime  Only sleep and engage in marital activities in the bed.  Avoid naps during the day, and increase your activity level  Avoid excess alcohol or over the counter sleeping medicine  Do not smoke--if you must smoke do so after dinner and not before bed  Get regular exercise--but do not do it within 2 hours of bedtime  Take a warm shower or bath 30 minutes before bedtime    Consider reading Dr Benigno Barrera's \"No More Sleepless Nights\"          Follow-ups after your visit        Who to contact     Please call your clinic at [phone] to:    Ask questions about your health    Make or cancel appointments    Discuss your medicines    Learn about your test results    Speak to your doctor            Additional Information About Your Visit        MyChart Information     [employer] coin is an electronic gateway that provides easy, online access to your medical records. With Black coin, you can request a clinic appointment, read your test results, renew a prescription or communicate with your care team.     To sign up for [email or web address] visit the website at [email or web address]   You will be asked to enter the access code listed below, as well as " some personal information. Please follow the directions to create your username and password.     Your access code is: [email or web address]  Expires: 6/3/2018  9:32 AM     Your access code will  in 90 days. If you need help or a new code, please contact your [facility] or call [phone] for assistance.        Care EveryWhere ID     This is your Care EveryWhere ID. This could be used by other organizations to access your [place] medical records  [id number]        Your Vitals Were     Pulse Temperature Pulse Oximetry BMI (Body Mass Index)          69 98.7  F (37.1  C) (Oral) 96% 28.86 kg/m2         Blood Pressure from Last 3 Encounters:   18 (!) 170/106   18 (!) 190/95   17 (!) 160/98    Weight from Last 3 Encounters:   18 178 lb 12.8 oz (81.1 kg)   18 177 lb 12.8 oz (80.6 kg)   17 194 lb 3.2 oz (88.1 kg)              Today, you had the following     No orders found for display         Today's Medication Changes          These changes are accurate as of 3/23/18 10:48 AM.  If you have any questions, ask your nurse or doctor.               Start taking these medicines.        Dose/Directions    melatonin 3 MG tablet   Used for:  Primary insomnia   Started by:  Doe Jackson MD        Dose:  3 mg   Take 1 tablet (3 mg) by mouth nightly as needed for sleep   Quantity:  100 tablet   Refills:  3            Where to get your medicines      These medications were sent to [facility] - [place], [place] - [address], [place] [address]     Phone:  [phone]     DAILY MULTIPLE VITAMINS Tabs    melatonin 3 MG tablet                Primary Care Provider Office Phone # Fax #    Doe Jackson [phone]       [address] [address]        Equal Access to Services     CATRACHITA CHAVEZ AH: Hadii aad ku hadasho Soomaali, waaxda luqadaha, qaybta kaalmamichelle steele  anna refugioprema arvindjeanie larositajocelyn ah. So [facility] [phone].    ATENCIÓN: Si esperanzala daniel, tiene a castellon disposición servicios gratuitos de asistencia lingüística. John downey [phone].    We comply with applicable federal civil rights laws and [place] laws. We do not discriminate on the basis of race, color, national origin, age, disability, sex, sexual orientation, or gender identity.            Thank you!     Thank you for choosing [facility]  for your care. Our goal is always to provide you with excellent care. Hearing back from our patients is one way we can continue to improve our services. Please take a few minutes to complete the written survey that you may receive in the mail after your visit with us. Thank you!             Your Updated Medication List - Protect others around you: Learn how to safely use, store and throw away your medicines at www.disposemymeds.org.          This list is accurate as of 3/23/18 10:48 AM.  Always use your most recent med list.                   Brand Name Dispense Instructions for use Diagnosis    acetaminophen 500 MG tablet    TYLENOL    100 tablet    Take 1-2 tablets (500-1,000 mg) by mouth 3 times daily as needed for pain Do not take more than 6 tablets in a day.  Do not take with other medications than contain acetaminophen    Closed nondisplaced comminuted fracture of shaft of right humerus with routine healing, subsequent encounter       albuterol 108 (90 BASE) MCG/ACT Inhaler    PROAIR HFA/PROVENTIL HFA/VENTOLIN HFA    1 Inhaler    Inhale 2 puffs into the lungs every 6 hours as needed for shortness of breath / dyspnea or wheezing    Chronic obstructive pulmonary disease, unspecified COPD type (H)       alum & mag hydroxide-simethicone 400-400-40 MG/5ML Susp suspension    MAALOX ADVANCED MAX ST    1 Bottle    Take 30 mLs by mouth every 4 hours as needed for indigestion    Gastrointestinal distress       baclofen 10 MG tablet    LIORESAL    90 tablet    Take 0.5-1 tablets  (5-10 mg) by mouth 3 times daily    Chronic left-sided low back pain without sciatica       calcium carbonate 500 MG chewable tablet    TUMS    90 chew tab    Take 1 tablet (500 mg) by mouth daily    Esophageal reflux       DAILY MULTIPLE VITAMINS Tabs     100 tablet    Take 1 tablet by mouth daily    Health care maintenance       lisinopril-hydrochlorothiazide 20-12.5 MG per tablet    PRINZIDE/ZESTORETIC    180 tablet    Take 2 tablets by mouth daily    Essential hypertension, benign       melatonin 3 MG tablet     100 tablet    Take 1 tablet (3 mg) by mouth nightly as needed for sleep    Primary insomnia       methadone 10 MG/ML (HIGH CONC) solution    DOLOPHINE-INTENSOL     Dosed by methadone clinic        order for DME     1 Units    Equipment being ordered: Cane    Chronic bilateral low back pain without sciatica       psyllium 63 % Powd    METAMUCIL SMOOTH TEXTURE    1 Bottle    Take 3 teaspoonful by mouth 3 times daily Mix in 8 ounces of water    Constipation       ranitidine 150 MG tablet    ZANTAC    180 tablet    Take 1 tablet (150 mg) by mouth 2 times daily as needed for heartburn    Gastroesophageal reflux disease without esophagitis       salmeterol 50 MCG/DOSE diskus inhaler    SEREVENT DISKUS    3 Inhaler    Inhale 1 puff (50 mcg) into the lungs 2 times daily    COPD (chronic obstructive pulmonary disease) (H)

## 2018-04-05 ENCOUNTER — OFFICE VISIT - HEALTHEAST (OUTPATIENT)
Dept: PODIATRY | Facility: CLINIC | Age: 68
End: 2018-04-05

## 2018-04-05 ENCOUNTER — TRANSFERRED RECORDS (OUTPATIENT)
Dept: HEALTH INFORMATION MANAGEMENT | Facility: CLINIC | Age: 68
End: 2018-04-05

## 2018-04-05 DIAGNOSIS — L60.2 ONYCHAUXIS: ICD-10-CM

## 2018-04-05 DIAGNOSIS — B35.1 NAIL FUNGUS: ICD-10-CM

## 2018-06-13 DIAGNOSIS — I10 ESSENTIAL HYPERTENSION, BENIGN: ICD-10-CM

## 2018-06-13 RX ORDER — LISINOPRIL AND HYDROCHLOROTHIAZIDE 12.5; 2 MG/1; MG/1
2 TABLET ORAL DAILY
Qty: 180 TABLET | Refills: 3 | Status: SHIPPED | OUTPATIENT
Start: 2018-06-13 | End: 2020-07-07

## 2018-09-10 ENCOUNTER — OFFICE VISIT (OUTPATIENT)
Dept: FAMILY MEDICINE | Facility: CLINIC | Age: 68
End: 2018-09-10
Payer: MEDICARE

## 2018-09-10 VITALS
DIASTOLIC BLOOD PRESSURE: 93 MMHG | TEMPERATURE: 98.6 F | HEART RATE: 69 BPM | WEIGHT: 169.4 LBS | RESPIRATION RATE: 20 BRPM | OXYGEN SATURATION: 95 % | SYSTOLIC BLOOD PRESSURE: 173 MMHG | BODY MASS INDEX: 27.34 KG/M2

## 2018-09-10 DIAGNOSIS — R07.81 RIB PAIN ON RIGHT SIDE: Primary | ICD-10-CM

## 2018-09-10 DIAGNOSIS — F51.01 PRIMARY INSOMNIA: ICD-10-CM

## 2018-09-10 RX ORDER — LANOLIN ALCOHOL/MO/W.PET/CERES
3 CREAM (GRAM) TOPICAL
Qty: 100 TABLET | Refills: 0 | Status: SHIPPED | OUTPATIENT
Start: 2018-09-10 | End: 2020-01-14

## 2018-09-10 RX ORDER — NAPROXEN 500 MG/1
500 TABLET ORAL 2 TIMES DAILY PRN
Qty: 30 TABLET | Refills: 1 | Status: SHIPPED | OUTPATIENT
Start: 2018-09-10 | End: 2020-01-14

## 2018-09-10 RX ORDER — OXYCODONE AND ACETAMINOPHEN 5; 325 MG/1; MG/1
1 TABLET ORAL
COMMUNITY
Start: 2018-09-08 | End: 2020-01-14

## 2018-09-10 NOTE — PROGRESS NOTES
SUBJECTIVE       Demetra Amin is a 67 year old  female with a PMHx significant for   Patient Active Problem List   Diagnosis     Essential hypertension     human herpesvirus infection     Osteoarthritis of multiple joints     Health Care Home     Chronic hepatitis C (H)     Polysubstance abuse     S/P total hip arthroplasty     History of alcohol abuse     Who presents today with rib pain after a fall at work. She works at a [employer] as a . She states she was on break when she tripped on a concrete step and fell onto her right side. She did not hit her head or lose consciousness. She hit her right hip and right ribs. She was seen on the day of the fall and 2 days ago at the ED. She had xrays done twice of her ribs and once of her right hip which were negative for fracture. She was initially prescribed motrin which did not help and then at the ED she was prescribed 12 tabs of percocet which she has taken all of.     Today, she continues to have pain in her right ribs. No shortness of breath or difficulty breathing. She is requesting more percocet. No fevers or chills.     Of note, she did not take her blood pressure medications today.     ROS: As stated in HPI.    PMH, Medications and Allergies were reviewed and updated as needed.          OBJECTIVE     Vitals:    09/10/18 1009 09/10/18 1011   BP: 174/89 (!) 173/93   Pulse: 69    Resp: 20    Temp: 98.6  F (37  C)    TempSrc: Oral    SpO2: 95%    Weight: 169 lb 6.4 oz (76.8 kg)      Body mass index is 27.34 kg/(m^2).    Gen:  Sitting in exam chair, pleasant, NAD  HEENT: Normocephalic, atraumatic. EOMI, no scleral icterus.  Neck: Supple.   CV:  RRR. No murmurs, rubs, or gallops. Good peripheral pulses  Pulm:  CTAB, no wheezes, rales, or rhonchi  Right ribs: No hematoma or ecchymosis. Diffuse tenderness to palpation over her whole right rib cage.   Extrem: Warm and well perfused. Strength appears normal  Neuro: Alert, oriented to person, place, and  time    No results found for this or any previous visit (from the past 24 hour(s)).    ASSESSMENT AND PLAN     1. Rib pain on right side  Patient presents after a fall onto her right side. Rib XR's at outside facilities were negative for fractures twice. On exam today she has no obvious hematoma or ecchymosis over her right ribs. Her pulse is normal she is breathing comfortably on room air. She is hypertensive but did not take her medications this morning. I feel she likely has a contusion or intercostal rib injury, no concern for lung injury at this time. She is requesting percocet. I offered naproxen and she instantly became very agitated and started cussing because I would not give her percocet. She eventually agreed to take naproxen. I did discuss return precautions.   - naproxen (NAPROSYN) 500 MG tablet; Take 1 tablet (500 mg) by mouth 2 times daily as needed for moderate pain  Dispense: 30 tablet; Refill: 1    2. Primary insomnia  Refill.   - melatonin 3 MG tablet; Take 1 tablet (3 mg) by mouth nightly as needed for sleep  Dispense: 100 tablet; Refill: 0      RTC in 4 weeks with her PCP for follow up of right rib injury or sooner if develops new or worsening symptoms. Return precautions discussed.     Discussed with MD Colby Aaron, PGY-2  [facility]

## 2018-09-10 NOTE — MR AVS SNAPSHOT
After Visit Summary   9/10/2018    Demetra Amin    MRN: [id number]           Patient Information     Date Of Birth          1950        Visit Information        Provider Department      9/10/2018 10:00 AM Colby Guaman MD [facility]        Today's Diagnoses     Rib pain on right side    -  1    Primary insomnia           Follow-ups after your visit        Follow-up notes from your care team     Return in about 4 weeks (around 10/8/2018).      Who to contact     Please call your clinic at [phone] to:    Ask questions about your health    Make or cancel appointments    Discuss your medicines    Learn about your test results    Speak to your doctor            Additional Information About Your Visit        Care EveryWhere ID     This is your Care EveryWhere ID. This could be used by other organizations to access your [place] medical records  [id number]        Your Vitals Were     Pulse Temperature Respirations Pulse Oximetry BMI (Body Mass Index)       69 98.6  F (37  C) (Oral) 20 95% 27.34 kg/m2        Blood Pressure from Last 3 Encounters:   09/10/18 (!) 173/93   03/23/18 (!) 170/106   03/05/18 (!) 190/95    Weight from Last 3 Encounters:   09/10/18 169 lb 6.4 oz (76.8 kg)   03/23/18 178 lb 12.8 oz (81.1 kg)   03/05/18 177 lb 12.8 oz (80.6 kg)              Today, you had the following     No orders found for display         Today's Medication Changes          These changes are accurate as of 9/10/18 11:59 PM.  If you have any questions, ask your nurse or doctor.               Start taking these medicines.        Dose/Directions    naproxen 500 MG tablet   Commonly known as:  NAPROSYN   Used for:  Rib pain on right side   Started by:  Colby Guaman MD        Dose:  500 mg   Take 1 tablet (500 mg) by mouth 2 times daily as needed for moderate pain   Quantity:  30 tablet   Refills:  1            Where to get your medicines      [facility]  - [place], [place] - [address]  [address], [place] [address]     Phone:  [phone]     melatonin 3 MG tablet    naproxen 500 MG tablet               Information about OPIOIDS     PRESCRIPTION OPIOIDS: WHAT YOU NEED TO KNOW   We gave you an opioid (narcotic) pain medicine. It is important to manage your pain, but opioids are not always the best choice. You should first try all the other options your care team gave you. Take this medicine for as short a time (and as few doses) as possible.    Some activities can increase your pain, such as bandage changes or therapy sessions. It may help to take your pain medicine 30 to 60 minutes before these activities. Reduce your stress by getting enough sleep, working on hobbies you enjoy and practicing relaxation or meditation. Talk to your care team about ways to manage your pain beyond prescription opioids.    These medicines have risks:    DO NOT drive when on new or higher doses of pain medicine. These medicines can affect your alertness and reaction times, and you could be arrested for driving under the influence (DUI). If you need to use opioids long-term, talk to your care team about driving.    DO NOT operate heavy machinery    DO NOT do any other dangerous activities while taking these medicines.    DO NOT drink any alcohol while taking these medicines.     If the opioid prescribed includes acetaminophen, DO NOT take with any other medicines that contain acetaminophen. Read all labels carefully. Look for the word  acetaminophen  or  Tylenol.  Ask your pharmacist if you have questions or are unsure.    You can get addicted to pain medicines, especially if you have a history of addiction (chemical, alcohol or substance dependence). Talk to your care team about ways to reduce this risk.    All opioids tend to cause constipation. Drink plenty of water and eat foods that have a lot of fiber, such as fruits, vegetables, prune juice, apple juice and  high-fiber cereal. Take a laxative (Miralax, milk of magnesia, Colace, Senna) if you don t move your bowels at least every other day. Other side effects include upset stomach, sleepiness, dizziness, throwing up, tolerance (needing more of the medicine to have the same effect), physical dependence and slowed breathing.    Store your pills in a secure place, locked if possible. We will not replace any lost or stolen medicine. If you don t finish your medicine, please throw away (dispose) as directed by your pharmacist. The [place] Pollution Control Agency has more information about safe disposal: https://www.pca.[facility].[place].us/living-green/managing-unwanted-medications         Primary Care Provider Office Phone # Fax #    Doe Jackson [phone]       [address] [place] [address]        Equal Access to Services     CATRACHITA CHAVEZ : Yeimy youngero Sojuni, waaxda luqadaha, qaybta kaalmacamille darnell, michelle remy . So [facility] [phone].    ATENCIÓN: Si habla español, tiene a castellon disposición servicios gratuitos de asistencia lingüística. Llame al [phone].    We comply with applicable federal civil rights laws and [place] laws. We do not discriminate on the basis of race, color, national origin, age, disability, sex, sexual orientation, or gender identity.            Thank you!     Thank you for choosing [facility] [address]  for your care. Our goal is always to provide you with excellent care. Hearing back from our patients is one way we can continue to improve our services. Please take a few minutes to complete the written survey that you may receive in the mail after your visit with us. Thank you!             Your Updated Medication List - Protect others around you: Learn how to safely use, store and throw away your medicines at www.disposemymeds.org.          This list is accurate as of 9/10/18 11:59 PM.  Always use your most recent med list.                    Brand Name Dispense Instructions for use Diagnosis    acetaminophen 500 MG tablet    TYLENOL    100 tablet    Take 1-2 tablets (500-1,000 mg) by mouth 3 times daily as needed for pain Do not take more than 6 tablets in a day.  Do not take with other medications than contain acetaminophen    Closed nondisplaced comminuted fracture of shaft of right humerus with routine healing, subsequent encounter       albuterol 108 (90 Base) MCG/ACT inhaler    PROAIR HFA/PROVENTIL HFA/VENTOLIN HFA    1 Inhaler    Inhale 2 puffs into the lungs every 6 hours as needed for shortness of breath / dyspnea or wheezing    Chronic obstructive pulmonary disease, unspecified COPD type (H)       alum & mag hydroxide-simethicone 400-400-40 MG/5ML Susp suspension    MAALOX ADVANCED MAX ST    1 Bottle    Take 30 mLs by mouth every 4 hours as needed for indigestion    Gastrointestinal distress       baclofen 10 MG tablet    LIORESAL    90 tablet    Take 0.5-1 tablets (5-10 mg) by mouth 3 times daily    Chronic left-sided low back pain without sciatica       calcium carbonate 500 MG chewable tablet    TUMS    90 chew tab    Take 1 tablet (500 mg) by mouth daily    Esophageal reflux       DAILY MULTIPLE VITAMINS Tabs     100 tablet    Take 1 tablet by mouth daily    Health care maintenance       lisinopril-hydrochlorothiazide 20-12.5 MG per tablet    PRINZIDE/ZESTORETIC    180 tablet    Take 2 tablets by mouth daily    Essential hypertension, benign       melatonin 3 MG tablet     100 tablet    Take 1 tablet (3 mg) by mouth nightly as needed for sleep    Primary insomnia       methadone 10 MG/ML (HIGH CONC) solution    DOLOPHINE-INTENSOL     Dosed by methadone clinic        naproxen 500 MG tablet    NAPROSYN    30 tablet    Take 1 tablet (500 mg) by mouth 2 times daily as needed for moderate pain    Rib pain on right side       order for DME     1 Units    Equipment being ordered: Cane    Chronic bilateral low back pain without sciatica        oxyCODONE-acetaminophen 5-325 MG per tablet    PERCOCET     Take 1 tablet by mouth        psyllium 63 % Powd    METAMUCIL SMOOTH TEXTURE    1 Bottle    Take 3 teaspoonful by mouth 3 times daily Mix in 8 ounces of water    Constipation       ranitidine 150 MG tablet    ZANTAC    180 tablet    Take 1 tablet (150 mg) by mouth 2 times daily as needed for heartburn    Gastroesophageal reflux disease without esophagitis       salmeterol 50 MCG/DOSE diskus inhaler    SEREVENT DISKUS    3 Inhaler    Inhale 1 puff (50 mcg) into the lungs 2 times daily    COPD (chronic obstructive pulmonary disease) (H)

## 2018-09-11 NOTE — PROGRESS NOTES
Preceptor Attestation:   Patient seen, evaluated and discussed with the resident. I have verified the content of the note, which accurately reflects my assessment of the patient and the plan of care.   Supervising Physician:  Jacob Correa MD

## 2019-06-04 ENCOUNTER — OFFICE VISIT (OUTPATIENT)
Dept: FAMILY MEDICINE | Facility: CLINIC | Age: 69
End: 2019-06-04
Payer: MEDICARE

## 2019-06-04 VITALS
OXYGEN SATURATION: 95 % | RESPIRATION RATE: 14 BRPM | DIASTOLIC BLOOD PRESSURE: 79 MMHG | HEART RATE: 60 BPM | TEMPERATURE: 98.1 F | BODY MASS INDEX: 29.38 KG/M2 | WEIGHT: 182 LBS | SYSTOLIC BLOOD PRESSURE: 159 MMHG

## 2019-06-04 DIAGNOSIS — J02.9 VIRAL PHARYNGITIS: Primary | ICD-10-CM

## 2019-06-04 NOTE — PATIENT INSTRUCTIONS
Patient Education     Viral Pharyngitis (Sore Throat)    You or your child have pharyngitis (sore throat). This infection is caused by a virus. It can cause throat pain that is worse when swallowing, aching all over, headache, and fever. The infection may be spread by coughing, kissing, or touching others after touching your mouth or nose. Antibiotic medicines do not work against viruses. They are not used for treating this illness.  Home care    If symptoms are severe, you or your child should rest at home. Return to work or school when you or your child feel well enough.     You or your child should drink plenty of fluids to prevent dehydration.    Use throat lozenges or numbing throat sprays to help reduce pain. Gargling with warm salt water will also help reduce throat pain. Dissolve 1/2 teaspoon of salt in 1 glass of warm water. Children can sip on juice or a popsicle. Children 5 years and older can also suck on a lollipop or hard candy.    Don t eat salty or spicy foods or give them to your child. These can be irritating to the throat.  Medicines for a child: You can give your child acetaminophen for fever, fussiness, or discomfort. In babies over 6 months of age, you may use ibuprofen instead of acetaminophen. If your child has chronic liver or kidney disease or ever had a stomach ulcer or GI bleeding, talk with your child s healthcare provider before giving these medicines. Aspirin should never be used by any child under 18 years of age who has a fever. It may cause severe liver damage.  Medicines for an adult: You may use acetaminophen or ibuprofen to control pain or fever, unless another medicine was prescribed for this. If you have chronic liver or kidney disease or ever had a stomach ulcer or GI bleeding, talk with your healthcare provider before using these medicines.  Follow-up care  Follow up with a healthcare provider or our staff if you or your child are not getting better over the next week.  When  to seek medical advice  Call your healthcare provider right away if any of these occur:    Fever as directed by your healthcare provider.  For children, seek care if:  ? Your child is of any age and has repeated fevers above 104 F (40 C).  ? Your child is younger than 2 years of age and has a fever of 100.4 F (38 C) for more than 1 day.  ? Your child is 2 years old or older and has a fever of 100.4 F (38 C) for more than 3 days.    New or worsening ear pain, sinus pain, or headache    Painful lumps in the back of neck    Stiff neck    Lymph nodes are getting larger    Can t swallow liquids, a lot of drooling, or can t open mouth wide due to throat pain    Signs of dehydration, such as very dark urine or no urine, sunken eyes, dizziness    Trouble breathing or noisy breathing    Muffled voice    New rash    Other symptoms are getting worse  Date Last Reviewed: 10/1/2017    8006-8544 [employer]. [address] [place] [address], [place] [address]. All rights reserved. This information is not intended as a substitute for professional medical care. Always follow your healthcare professional's instructions.

## 2019-06-04 NOTE — PROGRESS NOTES
Preceptor Attestation:   Patient seen, evaluated and discussed with the resident. I have verified the content of the note, which accurately reflects my assessment of the patient and the plan of care.   Supervising Physician:  Bull Elizabeth MD

## 2019-06-04 NOTE — PROGRESS NOTES
"     SUBJECTIVE       Demetra Amin is a 68 year old  female with a PMHx significant for:   Patient Active Problem List   Diagnosis     Essential hypertension     human herpesvirus infection     Osteoarthritis of multiple joints     Health Care Home     Chronic hepatitis C (H)     Polysubstance abuse (H)     S/P total hip arthroplasty     History of alcohol abuse     Patient is here today for concerns about a sore throat that started approximately 2 days ago.  She reports is very painful to swallow.  She reports a mild dry cough no fever, chills, rhinorrhea, congestion, abdominal pain, nausea, vomiting or diarrhea.  She does have a history of COPD and has occasionally heard herself wheezing.  She has not increased her inhaler usage.  She has no chest pain or shortness of breath.  Her appetite has been down secondary to odynophagia.  Urination is normal.  She lives alone, and has no known sick contacts.  She has been using salt water gargles with mild improvement.  She does report that since 2 days ago her sore throat appears to be slowly improving.    She also tells me that she needs an EKG for an undisclosed reason.  When I asked her reasoning, she reports it is \"personal.\"  I discussed with her that we would not be able to do EKGs in clinic unless she has an order from this other clinic requesting this.  She says she has this at home.    Patient speaks English and so an  was not used.    ROS: As stated in HPI.    PMH, Medications and Allergies were reviewed and updated as needed.        OBJECTIVE     Vitals:    06/04/19 1001   BP: 159/79   Pulse: 60   Resp: 14   Temp: 98.1  F (36.7  C)   TempSrc: Oral   SpO2: 95%   Weight: 82.6 kg (182 lb)     Body mass index is 29.38 kg/m .    Gen:  NAD, well developed, pleasant and cooperative  HEENT: EOMI.  Sclera clear.  TMs normal bilaterally.  Mild nasal discharge.  No turbinate hypertrophy.  Moist mucous membranes.  Mild posterior pharyngeal erythema and " tonsillar hypertrophy without exudate.  Neck: supple without lymphadenopathy, trachea midline  CV:  RRR  - no murmurs, rubs, or gallups  Pulm:  CTAB, no wheezes/rales/rhonchi  Psych: Mood and affect appropriate    No results found for this or any previous visit (from the past 24 hour(s)).    ASSESSMENT AND PLAN     Demetra was seen today for throat pain.    Diagnoses and all orders for this visit:    Viral pharyngitis    Patient's blood pressure is elevated, but she did not take any medications today.  Otherwise vitally stable.  She is nontoxic in appearance.  Given lack of fever and short duration of symptoms discussed with her likely viral etiology of her pharyngitis.  No exam findings to suggest the need for rapid strep today.  Discussed warning signs and symptoms to return for reevaluation.  Recommended Ibuprofen PRN for symptom management.    In regards to her requesting an EKG, I told her she needs to bring in the piece paper requesting this order from her other clinic.  She was advised to do this at her earliest convenience.    RTC PRN, or sooner if develops new or worsening symptoms.    Discussed with MD Jackie Vo, PGY-3

## 2020-01-08 ENCOUNTER — OFFICE VISIT (OUTPATIENT)
Dept: FAMILY MEDICINE | Facility: CLINIC | Age: 70
End: 2020-01-08
Payer: MEDICARE

## 2020-01-08 VITALS
BODY MASS INDEX: 29.09 KG/M2 | RESPIRATION RATE: 18 BRPM | SYSTOLIC BLOOD PRESSURE: 172 MMHG | TEMPERATURE: 98.8 F | OXYGEN SATURATION: 96 % | DIASTOLIC BLOOD PRESSURE: 72 MMHG | HEART RATE: 70 BPM | WEIGHT: 180.2 LBS

## 2020-01-08 DIAGNOSIS — I10 BENIGN ESSENTIAL HYPERTENSION: ICD-10-CM

## 2020-01-08 DIAGNOSIS — Z02.89 ENCOUNTER FOR COMPLETION OF FORM WITH PATIENT: Primary | ICD-10-CM
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
## 2020-01-08 NOTE — LETTER
[facility]  [address] [place] [address]  Phone: [phone]  Fax: [phone]    January 8, 2020        Demetra Amin  [address]   [place] [address]          To whom it may concern:    RE: Demetra L Eloisa    I have briefly evaluated Demetra and I feel that she is capable of handling her own finances and does not need a payee.    Please contact me for questions or concerns.      Sincerely,        Cobly Guaman MD

## 2020-01-08 NOTE — PROGRESS NOTES
Preceptor Attestation:   Patient seen, evaluated and discussed with the resident. I have verified the content of the note, which accurately reflects my assessment of the patient and the plan of care.   Supervising Physician:  Doe Jackson MD

## 2020-01-14 ENCOUNTER — OFFICE VISIT (OUTPATIENT)
Dept: FAMILY MEDICINE | Facility: CLINIC | Age: 70
End: 2020-01-14
Payer: MEDICARE

## 2020-01-14 VITALS
OXYGEN SATURATION: 96 % | RESPIRATION RATE: 20 BRPM | HEART RATE: 82 BPM | SYSTOLIC BLOOD PRESSURE: 129 MMHG | DIASTOLIC BLOOD PRESSURE: 78 MMHG | TEMPERATURE: 98.2 F

## 2020-01-14 DIAGNOSIS — Z00.00 HEALTH CARE MAINTENANCE: ICD-10-CM

## 2020-01-14 DIAGNOSIS — R07.81 RIB PAIN ON RIGHT SIDE: ICD-10-CM

## 2020-01-14 DIAGNOSIS — M25.551 HIP PAIN, RIGHT: Primary | ICD-10-CM

## 2020-01-14 RX ORDER — NAPROXEN 500 MG/1
500 TABLET ORAL 2 TIMES DAILY PRN
Qty: 30 TABLET | Refills: 1 | Status: SHIPPED | OUTPATIENT
Start: 2020-01-14 | End: 2023-01-01

## 2020-01-14 RX ORDER — MULTIVIT-MIN/IRON FUM/FOLIC AC 7.5 MG-4
1 TABLET ORAL DAILY
Qty: 100 TABLET | Refills: 3 | Status: SHIPPED | OUTPATIENT
Start: 2020-01-14 | End: 2020-07-07

## 2020-01-14 NOTE — PATIENT INSTRUCTIONS
Thank you for coming to [facility].  Imaging Center Locations    [facility] Office  [address] [place] [address]        [place] Office  [address]  [address]  [place] [address]    The phone number we will call with results is # [phone] (home) . If this is not the best number please call our clinic and change the number.  If you need any refills please call your pharmacy and they will contact us.  If you have any concerns about today's visit or wish to schedule another appointment please call our office during normal business hours [phone] (8-5:00 M-F)  If you have urgent medical concerns please call [phone] at any time of the day.  If you a medical emergency please call [phone]  Again thank you for choosing [facility] and please let us know how we can best partner with you to improve you and your family's health.

## 2020-01-14 NOTE — PROGRESS NOTES
"There are no exam notes on file for this visit.  Chief Complaint   Patient presents with     Musculoskeletal Problem     right leg pain, been going on for a few days, it starts at the hip and down to the knee and cant sleep     Forms     they will send him another form for social security office        Subjective: The patient tells me that the Social Security office will send us a form so that she can be named as the payee for her account.  She would like me to complete this when it arrives.    The patient complains of right hip and knee pain.  She tells me that this is been present over the past 2 weeks, but worse over the past 3 days.  It seems to be worse at night.  The pain is located in her right hip and radiates down into her right knee and back from the right knee up to the right hip again.      Initially, the patient denied falls or trauma.  On further reflection, the patient tells me that she did not have a \"hard fall\" but might of had a minor fall.    The patient is tried ibuprofen, Aleve, and extra strength Tylenol without improvement.    Objective:    Blood pressure 129/78, pulse 82, temperature 98.2  F (36.8  C), temperature source Oral, resp. rate 20, SpO2 96 %, not currently breastfeeding.  There is no height or weight on file to calculate BMI.    General:  Well nourished, and in no acute distress.  The vital signs are reviewed, and are within normal limits.  Examination of her extremities reveals normal strength in all major muscle groups.  The straight leg test is negative.  The patient does have some mild tenderness over the right greater trochanter.  Internal and external rotation of the hips is negative.  Distal sensation is intact to light touch.  She does not have any midline low back tenderness.  She does have some mild right paravertebral muscle tenderness in her low back.  Psych: Euthymic       Assessment and plan:        Hip pain, right  -     XR Hip Right 2-3 Views; Future    I attempted " to obtain an x-ray in our office.  However, unfortunately our x-ray machine is not currently functioning.  Therefore, the order for the x-ray is printed and given to the patient.  She will take this order with her to [facility].  Our staff assures me that this will be sufficient.  The patient has transportation to [facility].  I will communicate the results to her by phone or mail depending on the findings of the x-ray.    Rib pain on right side  -     naproxen (NAPROSYN) 500 MG tablet; Take 1 tablet (500 mg) by mouth 2 times daily as needed for moderate pain    Health care maintenance  -     multivitamins w/minerals tablet; Take 1 tablet by mouth daily    The patient requests a prescription for multivitamins.  This is provided for her.        Patient Instructions   Thank you for coming to [facility].  Imaging Center Locations    [facility] Office  [address] [place], [place] [id number] [place] [address] Office  [address] [place] [address] [address]  [place] [address]    The phone number we will call with results is # [phone] (home) . If this is not the best number please call our clinic and change the number.  If you need any refills please call your pharmacy and they will contact us.  If you have any concerns about today's visit or wish to schedule another appointment please call our office during normal business hours [phone] (8-5:00 M-F)  If you have urgent medical concerns please call [phone] at any time of the day.  If you a medical emergency please call [phone]  Again thank you for choosing [facility] and please let us know how we can best partner with you to improve you and your family's health.        The patient was actively involved in the decision making process, and all the questions were answered to their satisfaction prior to leaving.

## 2020-01-20 ENCOUNTER — DOCUMENTATION ONLY (OUTPATIENT)
Dept: FAMILY MEDICINE | Facility: CLINIC | Age: 70
End: 2020-01-20

## 2020-01-20 NOTE — PROGRESS NOTES
To be completed in Nursing note:    Please reference list for forms that require a visit for completion.  Please remind patients that providers are given 3-5 business days to complete and return forms.      Form type: Medical Source Opinion of Patients Capabliltity    Date form received: 2020    Date form completed by Physician: 2020    How was form returned to patient (mailed, faxed, or at  for patient to ): Mailed     Date form mailed/faxed/left at  for patient and sent to HIM for [email or web address]       Once form is left for patient, faxed, or mailed PCS will then close the documentation only encounter.

## 2020-01-21 ENCOUNTER — TRANSFERRED RECORDS (OUTPATIENT)
Dept: HEALTH INFORMATION MANAGEMENT | Facility: CLINIC | Age: 70
End: 2020-01-21

## 2020-04-13 ENCOUNTER — TELEPHONE (OUTPATIENT)
Dept: FAMILY MEDICINE | Facility: CLINIC | Age: 70
End: 2020-04-13

## 2020-04-13 NOTE — TELEPHONE ENCOUNTER
Called patient LM informing her that we are still open. We are open for telephone and virtual visits, which if she would like to call the clinic talk to one of our providers we can get you scheduled. Will also send a letter. BRAIN Chambers

## 2020-04-13 NOTE — LETTER
April 13, 2020      Demetra Amin  [address]   [place] [address]        Dear Demetra,      We tried reaching you by phone but were unable to connect with you. We are reaching out to see how you are doing. This is a very stressful time in the world, which can cause an increase in personal stress and anxiety.     Our clinic is open.  We are here for you and are ready to meet all of your healthcare needs.  We have delayed preventive care until July.  We want everyone who can to stay home during this time for their health and the health of all.  We are now having most visits over the phone, but will see people in person if your doctor agrees that it is necessary.  We also will have video visits starting on April 6, 2020.      Call us with any questions or concerns you may have, and know that we are all in this together.       Sincerely,     Your team at [facility]  [phone]

## 2020-07-07 ENCOUNTER — OFFICE VISIT (OUTPATIENT)
Dept: FAMILY MEDICINE | Facility: CLINIC | Age: 70
End: 2020-07-07
Payer: MEDICARE

## 2020-07-07 VITALS
SYSTOLIC BLOOD PRESSURE: 162 MMHG | RESPIRATION RATE: 16 BRPM | WEIGHT: 177 LBS | DIASTOLIC BLOOD PRESSURE: 96 MMHG | TEMPERATURE: 98.4 F | BODY MASS INDEX: 28.57 KG/M2 | HEART RATE: 85 BPM

## 2020-07-07 DIAGNOSIS — B18.2 CHRONIC HEPATITIS C WITHOUT HEPATIC COMA (H): ICD-10-CM

## 2020-07-07 DIAGNOSIS — I10 ESSENTIAL HYPERTENSION, BENIGN: Primary | ICD-10-CM

## 2020-07-07 DIAGNOSIS — J44.9 CHRONIC OBSTRUCTIVE PULMONARY DISEASE, UNSPECIFIED COPD TYPE (H): ICD-10-CM

## 2020-07-07 DIAGNOSIS — Z00.00 HEALTH CARE MAINTENANCE: ICD-10-CM

## 2020-07-07 DIAGNOSIS — R68.89 FORGETFULNESS: ICD-10-CM

## 2020-07-07 DIAGNOSIS — Z71.6 ENCOUNTER FOR SMOKING CESSATION COUNSELING: ICD-10-CM

## 2020-07-07 LAB
ALBUMIN SERPL-MCNC: 4 MG/DL (ref 3.3–4.9)
ALP SERPL-CCNC: 97.6 U/L (ref 40–150)
ALT SERPL-CCNC: 51.9 U/L (ref 0–45)
AST SERPL-CCNC: 71.9 U/L (ref 0–45)
BILIRUB SERPL-MCNC: 1.5 MG/DL (ref 0.2–1.3)
BUN SERPL-MCNC: 14.5 MG/DL (ref 7–19)
CALCIUM SERPL-MCNC: 11.3 MG/DL (ref 8.5–10.1)
CHLORIDE SERPLBLD-SCNC: 102.1 MMOL/L (ref 98–110)
CHOLEST SERPL-MCNC: 174.5 MG/DL (ref 0–200)
CHOLEST/HDLC SERPL: 2.3 {RATIO} (ref 0–5)
CO2 SERPL-SCNC: 27.7 MMOL/L (ref 20–32)
CREAT SERPL-MCNC: 0.7 MG/DL (ref 0.5–1)
GFR SERPL CREATININE-BSD FRML MDRD: 84.7 ML/MIN/1.7 M2
GLUCOSE SERPL-MCNC: 85 MG'DL (ref 70–99)
HCT VFR BLD AUTO: 45.7 % (ref 35–47)
HDLC SERPL-MCNC: 75.1 MG/DL
HEMOGLOBIN: 13.5 G/DL (ref 11.7–15.7)
HIV 1+2 AB+HIV1 P24 AG SERPL QL IA: NEGATIVE
LDLC SERPL CALC-MCNC: 89 MG/DL (ref 0–129)
MCH RBC QN AUTO: 27.8 PG (ref 26.5–35)
MCHC RBC AUTO-ENTMCNC: 29.5 G/DL (ref 32–36)
MCV RBC AUTO: 94.2 FL (ref 78–100)
PLATELET # BLD AUTO: 133 K/UL (ref 150–450)
POTASSIUM SERPL-SCNC: 4.7 MMOL/L (ref 3.2–4.6)
PROT SERPL-MCNC: 8.3 G/DL (ref 6.8–8.8)
RBC # BLD AUTO: 4.9 M/UL (ref 3.8–5.2)
SODIUM SERPL-SCNC: 136.6 MMOL/L (ref 132–142)
TRIGL SERPL-MCNC: 52.4 MG/DL (ref 0–150)
TSH SERPL DL<=0.05 MIU/L-ACNC: 1.05 UIU/ML (ref 0.3–5)
VIT B12 SERPL-MCNC: 833 PG/ML (ref 213–816)
VLDL CHOLESTEROL: 10.5 MG/DL (ref 7–32)
WBC # BLD AUTO: 9 K/UL (ref 4–11)

## 2020-07-07 RX ORDER — MULTIVIT-MIN/IRON FUM/FOLIC AC 7.5 MG-4
1 TABLET ORAL DAILY
Qty: 100 TABLET | Refills: 3 | Status: SHIPPED | OUTPATIENT
Start: 2020-07-07 | End: 2023-01-01

## 2020-07-07 RX ORDER — LISINOPRIL AND HYDROCHLOROTHIAZIDE 12.5; 2 MG/1; MG/1
2 TABLET ORAL DAILY
Qty: 180 TABLET | Refills: 3 | Status: SHIPPED | OUTPATIENT
Start: 2020-07-07 | End: 2023-01-01

## 2020-07-07 RX ORDER — ALBUTEROL SULFATE 90 UG/1
2 AEROSOL, METERED RESPIRATORY (INHALATION) EVERY 6 HOURS PRN
Qty: 1 INHALER | Refills: 1 | Status: SHIPPED | OUTPATIENT
Start: 2020-07-07 | End: 2023-01-01

## 2020-07-07 NOTE — LETTER
July 9, 2020      Demetra Amin  [address]   [place] [address]        Dear ,    We are writing to inform you of your test results.    Your lab work up did not suggest any reversible causes of memory loss. However, it did show very high levels of hepatitis C virus--similar to three years ago. This is very concerning as your liver is also showing evidence of damage as well. The next step would be to set you up with a GI specialist. I will see you on 7/22/20 for a follow up appointment to further discuss this with you. Feel free to contact the clinic if you have anything you want to discuss sooner.     Resulted Orders   Lipid Panel ([facility])   Result Value Ref Range    Cholesterol 174.5 0.0 - 200.0 mg/dL    Cholesterol/HDL Ratio 2.3 0.0 - 5.0    HDL Cholesterol 75.1 >40.0 mg/dL    LDL Cholesterol Calculated 89 0 - 129 mg/dL    Triglycerides 52.4 0.0 - 150.0 mg/dL    VLDL Cholesterol 10.5 7.0 - 32.0 mg/dL   Vitamin B12 ([employer])   Result Value Ref Range    Vitamin B12 833 (H) 213 - 816 pg/mL    Narrative    Test performed by:  [facility] [address] [place] [address]   HIV Ag/Ab Screen Cope ([facility])   Result Value Ref Range    HIV Antigen/Antibody Negative Negative    Narrative    Test performed by:  [facility] [address] [place] [address]  Method is Abbott HIV Ag/Ab for the detection of HIV p24 antigen, HIV-1   antibodies and HIV-2 antibodies.   Thyroid Cope ([employer])   Result Value Ref Range    TSH 1.05 0.30 - 5.00 uIU/mL    Narrative    Test performed by:  [facility] [address] [place] [address]   CBC with Plt ([facility])   Result Value Ref Range    WBC 9.0 4.0 - 11.0 K/uL    RBC 4.9 3.8 - 5.2 M/uL    Hemoglobin 13.5 11.7 - 15.7 g/dL    Hematocrit 45.7 35.0 - 47.0 %    MCV 94.2 78.0 - 100.0 fL    MCH 27.8 26.5 - 35.0    MCHC 29.5 (L) 32.0 - 36.0 g/dL    Platelets 133.0 (L) 150.0 - 450.0 K/uL   Syphilis Screen Cascade  (RPR/VDRL) ([facility])   Result Value Ref Range    Treponema Antibody (Syphilis) Negative Negative    Narrative    Test performed by:  [facility] [address] [place] [address]   Comprehensive Metabolic Panel ([facility])   Result Value Ref Range    Albumin 4.0 3.3 - 4.9 mg/dL    Alkaline Phosphatase 97.6 40.0 - 150.0 U/L    ALT 51.9 (H) 0.0 - 45.0 U/L    AST 71.9 (H) 0.0 - 45.0 U/L    Bilirubin Total 1.5 (H) 0.2 - 1.3 mg/dL    Urea Nitrogen 14.5 7.0 - 19.0 mg/dL    Calcium 11.3 (H) 8.5 - 10.1 mg/dL    Chloride 102.1 98.0 - 110.0 mmol/L    Carbon Dioxide 27.7 20.0 - 32.0 mmol/L    Creatinine 0.7 0.5 - 1.0 mg/dL    Glucose 85.0 70.0 - 99.0 mg'dL    Potassium 4.7 (H) 3.2 - 4.6 mmol/L    Sodium 136.6 132.0 - 142.0 mmol/L    Protein Total 8.3 6.8 - 8.8 g/dL    GFR Estimate 84.7 >60.0 mL/min/1.7 m2    GFR Estimate If Black >90 >60.0 mL/min/1.7 m2   Hepatitis C RNA by RT-PCR ([employer])   Result Value Ref Range    HCV RNA QUANT 817195 (A) HCVND [IU]/mL      Comment:      The [employer] AmpliPrep/[employer]IA TaqMan HCV Test is an FDA-approved in vitro nucleic  acid amplification test for the quantitation of HCV RNA in human plasma (ETDA  plasma) or serum using the [employer] AmpliPrep Instrument for automated viral  nucleic acid extraction and the [employer] TaqMan Analyzer or [employer] TaqMan for  automated Real Time PCR amplification and detection of the viral nucleic acid  target.  Titer results are reported in International Units/mL (IU/mL) using the 1st WHO  International standard for HCV for Nucleic Acid Amplification based assays.      LOG OF HCV RNA QT 5.8 (H) <1.2 Log IU/mL      Comment:      Performed and/or entered by:  [facility]  [address] [place] [address]      Narrative    Test performed by:  [facility]  [address], [place] [address]       If you have any questions or concerns, please call the clinic at the number listed above.        Sincerely,        Angelito Cunningham MD

## 2020-07-07 NOTE — PROGRESS NOTES
Preceptor Attestation:    Patient seen and evaluated in person. I discussed the patient with the resident. I have verified the content of the note, which accurately reflects my assessment of the patient and the plan of care.   Supervising Physician:  Kamron Pritchard MD.

## 2020-07-07 NOTE — PATIENT INSTRUCTIONS
Patient Education     Established High Blood Pressure    High blood pressure (hypertension) is a chronic disease. Often, healthcare providers don t know what causes it. But it can be caused by certain health conditions and medicines.  If you have high blood pressure, you may not have any symptoms. If you do have symptoms, they may include headache, dizziness, changes in your vision, chest pain, and shortness of breath. But even without symptoms, high blood pressure that s not treated raises your risk for heart attack, heart failure, and stroke. High blood pressure is a serious health risk and shouldn t be ignored.  Blood pressure measurements are given as 2 numbers. Systolic blood pressure is the upper number. This is the pressure when the heart contracts. Diastolic blood pressure is the lower number. This is the pressure when the heart relaxes between beats. You will see your blood pressure readings written together. For example, a person with a systolic pressure of 118 and a diastolic pressure of 78 will have 118/78 written in the medical record.  Blood pressure is categorized as normal, elevated, or stage 1 or stage 2 high blood pressure:    Normal blood pressure is systolic of less than 120 and diastolic of less than 80 (120/80)    Elevated blood pressure is systolic of 120 to 129 and diastolic less than 80    Stage 1 high blood pressure is systolic is 130 to 139 or diastolic between 80 to 89    Stage 2 high blood pressure is when systolic is 140 or higher or the diastolic is 90 or higher  Home care  If you have high blood pressure, follow these home care guidelines to help lower your blood pressure. If you are taking medicines for high blood pressure, these methods may reduce or end your need for medicines in the future.    Start a weight-loss program if you are overweight.    Cut back on how much salt you get in your diet. Here s how to do this:  ? Don t eat foods that have a lot of salt. These include  olives, pickles, smoked meats, and salted potato chips.  ? Don t add salt to your food at the table.  ? Use only small amounts of salt when cooking.    Start an exercise program. Talk with your healthcare provider about the type of exercise program that would be best for you. It doesn't have to be hard. Even brisk walking for 20 minutes 3 times a week is a good form of exercise.    Don t take medicines that stimulate the heart. This includes many over-the-counter cold and sinus decongestant pills and sprays, as well as diet pills. Check the warnings about high blood pressure on the label. Before buying any over-the-counter medicines or supplements, always ask the pharmacist about the product's potential interaction with your high blood pressure and your high blood pressure medicines.    Stimulants such as amphetamine or cocaine could be deadly for someone with high blood pressure. Never take these.    Limit how much caffeine you get in your diet. Switch to caffeine-free products.    Stop smoking. If you are a long-time smoker, this can be hard. Talk to your healthcare provider about medicines and nicotine replacement options to help you. Also, enroll in a stop-smoking program to make it more likely that you will quit for good.    Learn how to handle stress. This is an important part of any program to lower blood pressure. Learn about relaxation methods like meditation, yoga, or biofeedback.    If your provider prescribed medicines, take them exactly as directed. Missing doses may cause your blood pressure get out of control.    If you miss a dose or doses, check with your healthcare provider or pharmacist about what to do.    Consider buying an automatic blood pressure machine to check your blood pressure at home. Ask your provider for a recommendation. You can get one of these at most pharmacies.     The American Heart Association recommends the following guidelines for home blood pressure monitoring:    Don't  smoke or drink coffee for 30 minutes before taking your blood pressure.    Go to the bathroom before the test.    Relax for 5 minutes before taking the measurement.    Sit with your back supported (don't sit on a couch or soft chair); keep your feet on the floor uncrossed. Place your arm on a solid flat surface (like a table) with the upper part of the arm at heart level. Place the middle of the cuff directly above the bend of the elbow. Check the monitor's instruction manual for an illustration.    Take multiple readings. When you measure, take 2 to 3 readings one minute apart and record all of the results.    Take your blood pressure at the same time every day, or as your healthcare provider recommends.    Record the date, time, and blood pressure reading.    Take the record with you to your next medical appointment. If your blood pressure monitor has a built-in memory, simply take the monitor with you to your next appointment.    Call your provider if you have several high readings. Don't be frightened by a single high blood pressure reading, but if you get several high readings, check in with your healthcare provider.    Note: When blood pressure reaches a systolic (top number) of 180 or higher OR diastolic (bottom number) of 110 or higher, seek emergency medical treatment.  Follow-up care  You will need to see your healthcare provider regularly. This is to check your blood pressure and to make changes to your medicines. Make a follow-up appointment as directed. Bring the record of your home blood pressure readings to the appointment.  When to seek medical advice  Call your healthcare provider right away if any of these occur:    Blood pressure reaches a systolic (upper number) of 180 or higher OR a diastolic (bottom number) of 110 or higher    Chest pain or shortness of breath    Severe headache    Throbbing or rushing sound in the ears    Nosebleed    Sudden severe pain in your belly (abdomen)    Extreme  drowsiness, confusion, or fainting    Dizziness or spinning sensation (vertigo)    Weakness of an arm or leg or one side of the face    You have problems speaking or seeing   Date Last Reviewed: 2016-2019 [employer]. [address] [place] [address]. All rights reserved. This information is not intended as a substitute for professional medical care. Always follow your healthcare professional's instructions.         20   MAMMO SCREENING  [facility]   [phone]  Fax Orders to [phone]     Order faxed to [phone], they will contact patient to schedule.     Rhona Lantigua

## 2020-07-07 NOTE — PROGRESS NOTES
There are no exam notes on file for this visit.  Chief Complaint   Patient presents with     Recheck Medication     Memory Loss     Forgetting a lot of things.     Blood pressure (!) 162/96, pulse 85, temperature 98.4  F (36.9  C), resp. rate 16, weight 80.3 kg (177 lb), not currently breastfeeding.           FERNANDO Mccrary is a 69 year old  female with a PMH significant for:     Patient Active Problem List   Diagnosis     Essential hypertension     human herpesvirus infection     Osteoarthritis of multiple joints     Health Care Home     Chronic hepatitis C (H)     Polysubstance abuse (H)     S/P total hip arthroplasty     History of alcohol abuse     She presents with multiple concerns.      1. Hypertension: States that she lost her medication and has not been taking it for the past 3 weeks.  Unable to tell me the exact name of her medications.  Does recall that it was increased recently.  Denies any chest pain, shortness of breath, lightheadedness, dizziness, claudication.    2. Increasing forgetfulness: States that for the past several months she has had difficulty recalling things.  Describes being in an elevator and forgetting to pass out for a number.  Also with difficulty sleeping during this same time period--is tried a friend's Flexeril with minimal relief.  Denies any family history of dementia.    3. Tobacco use: Smokes about a pack per day since age 13.  Interested in quitting, but not ready at the moment.  Describes the desire to quit on the spot if it ever in future.  Does have a brother who recently passed away from a heart attack, likely from complication of heart disease and smoking.      PMH, Medications and Allergies were reviewed and updated as needed.        REVIEW OF SYSTEMS     Constitutional, HEENT, cardiovascular, pulmonary, GI, , musculoskeletal, neuro, skin, endocrine and psych systems are negative, except as otherwise noted.        OBJECTIVE     Vitals:    07/07/20 0842  07/07/20 0846   BP: (!) 171/94 (!) 162/96   Pulse: 86 85   Resp: 18 16   Temp: 98.4  F (36.9  C) 98.4  F (36.9  C)   Weight: 80.3 kg (177 lb)      Body mass index is 28.57 kg/m .    Constitutional: Awake, alert, cooperative, no apparent distress, and appears stated age.  Eyes: sclera clear, conjunctiva normal.  ENT: NC/AT, MMM  Neck: Supple, symmetrical, trachea midline  Back: Symmetric, no curvature  Lungs: No increased WOB, CTABL, no crackles or wheezing appreciated.  Cardiovascular: RRR, normal S1 and S2, no S3 or S4, and no murmur appreciated.  Abdomen: Normal BS, soft, non-distended, non-tender, no masses palpated  Musculoskeletal: No redness, warmth, or swelling of the joints. Tone is normal.  Neurologic: A&Ox3.  Cranial nerves II-XII are grossly intact.  Sensory is intact and gait is normal.  Neuropsychiatric: Normal affect, mood, orientation, memory and insight.  Skin: No visible rashes, erythema, pallor, petechia or purpura.    Results for orders placed or performed in visit on 07/07/20 (from the past 24 hour(s))   Lipid Panel ([place])   Result Value Ref Range    Cholesterol 174.5 0.0 - 200.0 mg/dL    Cholesterol/HDL Ratio 2.3 0.0 - 5.0    HDL Cholesterol 75.1 >40.0 mg/dL    LDL Cholesterol Calculated 89 0 - 129 mg/dL    Triglycerides 52.4 0.0 - 150.0 mg/dL    VLDL Cholesterol 10.5 7.0 - 32.0 mg/dL   CBC with Plt ([facility])   Result Value Ref Range    WBC 9.0 4.0 - 11.0 K/uL    RBC 4.9 3.8 - 5.2 M/uL    Hemoglobin 13.5 11.7 - 15.7 g/dL    Hematocrit 45.7 35.0 - 47.0 %    MCV 94.2 78.0 - 100.0 fL    MCH 27.8 26.5 - 35.0    MCHC 29.5 (L) 32.0 - 36.0 g/dL    Platelets 133.0 (L) 150.0 - 450.0 K/uL   Comprehensive Metabolic Panel ([place])   Result Value Ref Range    Albumin 4.0 3.3 - 4.9 mg/dL    Alkaline Phosphatase 97.6 40.0 - 150.0 U/L    ALT 51.9 (H) 0.0 - 45.0 U/L    AST 71.9 (H) 0.0 - 45.0 U/L    Bilirubin Total 1.5 (H) 0.2 - 1.3 mg/dL    Urea Nitrogen 14.5 7.0 - 19.0 mg/dL    Calcium 11.3 (H) 8.5 - 10.1  mg/dL    Chloride 102.1 98.0 - 110.0 mmol/L    Carbon Dioxide 27.7 20.0 - 32.0 mmol/L    Creatinine 0.7 0.5 - 1.0 mg/dL    Glucose 85.0 70.0 - 99.0 mg'dL    Potassium 4.7 (H) 3.2 - 4.6 mmol/L    Sodium 136.6 132.0 - 142.0 mmol/L    Protein Total 8.3 6.8 - 8.8 g/dL    GFR Estimate 84.7 >60.0 mL/min/1.7 m2    GFR Estimate If Black >90 >60.0 mL/min/1.7 m2         ASSESSMENT AND PLAN     Demetra was seen today for recheck medication and memory loss.    Diagnoses and all orders for this visit:    Essential hypertension, benign  Follow up in two weeks for repeat blood pressure check.    -     lisinopril-hydrochlorothiazide (ZESTORETIC) 20-12.5 MG tablet; Take 2 tablets by mouth daily  -     Comprehensive Metabolic Panel ([place])    Forgetfulness  Did score 19/30 on MOCA with me today, indicating some cognitive deficits. Patient does have her GED. Will work up dementia right now and wait on head CT vs MRI at this time and reassess at next visit. Negative history of dementia.   -     Vitamin B12 ([facility])  -     HIV Ag/Ab Screen [place] ([facility])  -     Thyroid [place] ([facility])  -     CBC with Plt ([facility])  -     Syphilis Screen [place] (RPR/VDRL) ([facility])  -     Comprehensive Metabolic Panel ([place])    Chronic hepatitis C without hepatic coma (H)  Last HCV RNA on 3/5/18 extremely high. Not currently on any meds for this. Will recheck HCV levels with possible GI referral if still elevated.   -     Comprehensive Metabolic Panel ([place])  -     Hepatitis C RNA by RT-PCR ([facility])    Chronic obstructive pulmonary disease, unspecified COPD type (H)  -     albuterol (PROAIR HFA/PROVENTIL HFA/VENTOLIN HFA) 108 (90 Base) MCG/ACT inhaler; Inhale 2 puffs into the lungs every 6 hours as needed for shortness of breath / dyspnea or wheezing    Health care maintenance  -     multivitamins w/minerals tablet; Take 1 tablet by mouth daily  -     MA SCREENING DIGITAL BILAT; Future  -     Lipid Panel  ([place])    Encounter for smoking cessation counseling  Currently in precontemplative state. Has tried nicorette gum in the past. Not interested in nicotine replacement therapy at this time.         RTC in 2 weeks for follow up of HTN and forgetfulness or sooner if develops new or worsening symptoms.    Angelito Cunningham MD  7/7/2020    Precepted with Dr. Pritchard, who agree with assessment and plan

## 2020-07-08 ENCOUNTER — RECORDS - HEALTHEAST (OUTPATIENT)
Dept: ADMINISTRATIVE | Facility: OTHER | Age: 70
End: 2020-07-08

## 2020-07-08 ENCOUNTER — RECORDS - HEALTHEAST (OUTPATIENT)
Dept: SCHEDULING | Facility: CLINIC | Age: 70
End: 2020-07-08

## 2020-07-08 DIAGNOSIS — Z12.31 VISIT FOR SCREENING MAMMOGRAM: ICD-10-CM

## 2020-07-08 LAB — TREPONEMA ANTIBODY (SYPHILIS): NEGATIVE

## 2020-07-09 ENCOUNTER — TELEPHONE (OUTPATIENT)
Dept: FAMILY MEDICINE | Facility: CLINIC | Age: 70
End: 2020-07-09

## 2020-07-09 LAB
HCV RNA QUANT: ABNORMAL [IU]/ML
LOG OF HCV RNA QT: 5.8 LOG IU/ML

## 2020-07-09 NOTE — RESULT ENCOUNTER NOTE
Your lab work up did not suggest any reversible causes of memory loss. However, it did show very high levels of hepatitis C virus--similar to three years ago. This is very concerning as your liver is also showing evidence of damage as well. The next step would be to set you up with a GI specialist. I will see you on 7/22/20 for a follow up appointment to further discuss this with you. Feel free to contact the clinic if you have anything you want to discuss sooner.

## 2020-07-09 NOTE — TELEPHONE ENCOUNTER
Attempted to call patient to inform her of lab results, specifically HCV levels and LFTs. Unfortunately was not able to reach patient.     It seems that patient has been referred to GI in the past, but we do not have records of her visits with them. Patient does have f/u appointment with me for HTN in a couple weeks. Will plan to address HCV levels at that time and clarify if she has seen GI in the past.      Angelito Cunningham MD  7/9/2020

## 2021-02-22 ENCOUNTER — TELEPHONE (OUTPATIENT)
Dept: FAMILY MEDICINE | Facility: CLINIC | Age: 71
End: 2021-02-22

## 2021-02-22 NOTE — TELEPHONE ENCOUNTER
Spoke with patient over the weekend regarding missing her vaccination appointment. She noted she was hoping to see a doctor to discuss memory issues she had been having. Called to help set this up, TCC. ./LR

## 2021-05-25 ENCOUNTER — RECORDS - HEALTHEAST (OUTPATIENT)
Dept: ADMINISTRATIVE | Facility: CLINIC | Age: 71
End: 2021-05-25

## 2021-05-26 ENCOUNTER — RECORDS - HEALTHEAST (OUTPATIENT)
Dept: ADMINISTRATIVE | Facility: CLINIC | Age: 71
End: 2021-05-26

## 2021-05-27 ENCOUNTER — RECORDS - HEALTHEAST (OUTPATIENT)
Dept: ADMINISTRATIVE | Facility: CLINIC | Age: 71
End: 2021-05-27

## 2021-05-28 ENCOUNTER — RECORDS - HEALTHEAST (OUTPATIENT)
Dept: ADMINISTRATIVE | Facility: CLINIC | Age: 71
End: 2021-05-28

## 2021-05-29 ENCOUNTER — RECORDS - HEALTHEAST (OUTPATIENT)
Dept: ADMINISTRATIVE | Facility: CLINIC | Age: 71
End: 2021-05-29

## 2021-05-30 ENCOUNTER — RECORDS - HEALTHEAST (OUTPATIENT)
Dept: ADMINISTRATIVE | Facility: CLINIC | Age: 71
End: 2021-05-30

## 2021-06-17 NOTE — PROGRESS NOTES
Subjective findings: The patient return to the clinic today complaining of long thick nails both feet.  The patient requested all the nails be removed because she did not like the appearance of the nails.  She stated that her nails are unsightly and she does not want to wear sandals.    Objective findings: Nails bilaterally are elongated in 2-3 times normal thickness and severely discolored 1 through 5 both feet.  Skin bilaterally warm supple and intact.  DP and PT pulses +2 over 4 bilateral feet.  Capillary refill less than 2 seconds bilateral feet.  Negative clonus, negative Babinski bilateral feet.  Range of motion within normal limits bilateral feet.  Range of motion within normal limits bilateral feet.  Muscle power +4 over 5 bilaterally with similar components.Psychiatric: Patient and affect were normal.  Patient insight and judgment are normal.     Assessment: Onychomycosis, onychauxis     Plan: I debrided the nails 1 through 5 bilateral feet today.  I informed the patient that her nails are not causing any pain she does not have any history of bacterial infection.  I informed the patient that with continued routine debridement her nails should look and feel markedly improved.  I do not recommend total nail excision and matricectomy of all of her toenails to improve her appearance.  I recommended the patient return to the clinic every 3 months for continued foot care.

## 2023-01-01 ENCOUNTER — TELEPHONE (OUTPATIENT)
Dept: FAMILY MEDICINE | Facility: CLINIC | Age: 73
End: 2023-01-01

## 2023-01-01 ENCOUNTER — OFFICE VISIT (OUTPATIENT)
Dept: FAMILY MEDICINE | Facility: CLINIC | Age: 73
End: 2023-01-01
Payer: MEDICARE

## 2023-01-01 ENCOUNTER — TELEPHONE (OUTPATIENT)
Dept: FAMILY MEDICINE | Facility: CLINIC | Age: 73
End: 2023-01-01
Payer: MEDICARE

## 2023-01-01 ENCOUNTER — ANCILLARY PROCEDURE (OUTPATIENT)
Dept: MAMMOGRAPHY | Facility: HOSPITAL | Age: 73
End: 2023-01-01
Attending: FAMILY MEDICINE
Payer: MEDICARE

## 2023-01-01 ENCOUNTER — HOSPITAL ENCOUNTER (OUTPATIENT)
Dept: BONE DENSITY | Facility: HOSPITAL | Age: 73
Discharge: HOME | End: 2023-09-08
Attending: FAMILY MEDICINE
Payer: MEDICARE

## 2023-01-01 ENCOUNTER — TRANSITIONAL CARE UNIT VISIT (OUTPATIENT)
Dept: GERIATRICS | Facility: CLINIC | Age: 73
End: 2023-01-01
Payer: MEDICARE

## 2023-01-01 ENCOUNTER — OFFICE VISIT (OUTPATIENT)
Dept: NEUROPSYCHOLOGY | Facility: CLINIC | Age: 73
End: 2023-01-01
Attending: FAMILY MEDICINE
Payer: MEDICARE

## 2023-01-01 ENCOUNTER — HOSPITAL ENCOUNTER (OUTPATIENT)
Dept: ULTRASOUND IMAGING | Facility: HOSPITAL | Age: 73
Discharge: HOME | End: 2023-09-08
Attending: FAMILY MEDICINE
Payer: MEDICARE

## 2023-01-01 ENCOUNTER — HOSPITAL ENCOUNTER (OUTPATIENT)
Dept: MRI IMAGING | Facility: HOSPITAL | Age: 73
Discharge: HOME | End: 2023-09-08
Attending: FAMILY MEDICINE
Payer: MEDICARE

## 2023-01-01 VITALS
DIASTOLIC BLOOD PRESSURE: 87 MMHG | SYSTOLIC BLOOD PRESSURE: 146 MMHG | TEMPERATURE: 99.1 F | OXYGEN SATURATION: 94 % | WEIGHT: 150 LBS | RESPIRATION RATE: 16 BRPM | HEART RATE: 75 BPM | BODY MASS INDEX: 24.21 KG/M2

## 2023-01-01 VITALS
RESPIRATION RATE: 16 BRPM | BODY MASS INDEX: 24.86 KG/M2 | HEART RATE: 77 BPM | OXYGEN SATURATION: 94 % | DIASTOLIC BLOOD PRESSURE: 80 MMHG | TEMPERATURE: 99.2 F | SYSTOLIC BLOOD PRESSURE: 144 MMHG | WEIGHT: 154 LBS

## 2023-01-01 VITALS
TEMPERATURE: 97.7 F | SYSTOLIC BLOOD PRESSURE: 138 MMHG | OXYGEN SATURATION: 98 % | RESPIRATION RATE: 20 BRPM | WEIGHT: 138.6 LBS | HEART RATE: 69 BPM | BODY MASS INDEX: 22.37 KG/M2 | DIASTOLIC BLOOD PRESSURE: 83 MMHG

## 2023-01-01 VITALS
RESPIRATION RATE: 18 BRPM | DIASTOLIC BLOOD PRESSURE: 80 MMHG | SYSTOLIC BLOOD PRESSURE: 149 MMHG | BODY MASS INDEX: 22.53 KG/M2 | TEMPERATURE: 97.4 F | HEART RATE: 80 BPM | HEIGHT: 64 IN | OXYGEN SATURATION: 90 % | WEIGHT: 132 LBS

## 2023-01-01 VITALS
HEART RATE: 93 BPM | OXYGEN SATURATION: 96 % | WEIGHT: 138.4 LBS | BODY MASS INDEX: 22.34 KG/M2 | DIASTOLIC BLOOD PRESSURE: 83 MMHG | RESPIRATION RATE: 14 BRPM | SYSTOLIC BLOOD PRESSURE: 121 MMHG

## 2023-01-01 VITALS
RESPIRATION RATE: 16 BRPM | HEART RATE: 92 BPM | WEIGHT: 166.6 LBS | TEMPERATURE: 98.5 F | BODY MASS INDEX: 26.89 KG/M2 | OXYGEN SATURATION: 97 % | DIASTOLIC BLOOD PRESSURE: 98 MMHG | SYSTOLIC BLOOD PRESSURE: 150 MMHG

## 2023-01-01 VITALS
OXYGEN SATURATION: 96 % | SYSTOLIC BLOOD PRESSURE: 158 MMHG | DIASTOLIC BLOOD PRESSURE: 79 MMHG | RESPIRATION RATE: 12 BRPM | WEIGHT: 147.6 LBS | TEMPERATURE: 98.8 F | HEART RATE: 79 BPM | BODY MASS INDEX: 23.82 KG/M2

## 2023-01-01 VITALS
SYSTOLIC BLOOD PRESSURE: 154 MMHG | TEMPERATURE: 98.6 F | BODY MASS INDEX: 23.95 KG/M2 | HEART RATE: 72 BPM | WEIGHT: 148.4 LBS | DIASTOLIC BLOOD PRESSURE: 81 MMHG | OXYGEN SATURATION: 96 % | RESPIRATION RATE: 16 BRPM

## 2023-01-01 DIAGNOSIS — Z12.11 SCREEN FOR COLON CANCER: ICD-10-CM

## 2023-01-01 DIAGNOSIS — R53.81 PHYSICAL DECONDITIONING: ICD-10-CM

## 2023-01-01 DIAGNOSIS — Z23 NEED FOR PROPHYLACTIC VACCINATION AGAINST HEPATITIS A: ICD-10-CM

## 2023-01-01 DIAGNOSIS — E55.9 VITAMIN D DEFICIENCY, UNSPECIFIED: ICD-10-CM

## 2023-01-01 DIAGNOSIS — M1A.0491 IDIOPATHIC CHRONIC GOUT OF HAND WITH TOPHUS, UNSPECIFIED LATERALITY: ICD-10-CM

## 2023-01-01 DIAGNOSIS — G89.4 CHRONIC PAIN SYNDROME: ICD-10-CM

## 2023-01-01 DIAGNOSIS — Z78.0 POSTMENOPAUSAL: ICD-10-CM

## 2023-01-01 DIAGNOSIS — R05.1 ACUTE COUGH: ICD-10-CM

## 2023-01-01 DIAGNOSIS — J44.9 CHRONIC OBSTRUCTIVE PULMONARY DISEASE, UNSPECIFIED COPD TYPE (H): ICD-10-CM

## 2023-01-01 DIAGNOSIS — R41.3 MEMORY DEFICIT: Primary | ICD-10-CM

## 2023-01-01 DIAGNOSIS — R41.89 COGNITIVE IMPAIRMENT: ICD-10-CM

## 2023-01-01 DIAGNOSIS — I10 ESSENTIAL HYPERTENSION, BENIGN: ICD-10-CM

## 2023-01-01 DIAGNOSIS — F19.10 POLYSUBSTANCE ABUSE (H): Primary | ICD-10-CM

## 2023-01-01 DIAGNOSIS — I10 BENIGN ESSENTIAL HYPERTENSION: ICD-10-CM

## 2023-01-01 DIAGNOSIS — Z12.31 VISIT FOR SCREENING MAMMOGRAM: ICD-10-CM

## 2023-01-01 DIAGNOSIS — J44.9 CHRONIC OBSTRUCTIVE PULMONARY DISEASE, UNSPECIFIED COPD TYPE (H): Primary | ICD-10-CM

## 2023-01-01 DIAGNOSIS — B18.2 CHRONIC HEPATITIS C WITHOUT HEPATIC COMA (H): Primary | ICD-10-CM

## 2023-01-01 DIAGNOSIS — R41.3 MEMORY LOSS: ICD-10-CM

## 2023-01-01 DIAGNOSIS — R41.3 MEMORY LOSS: Primary | ICD-10-CM

## 2023-01-01 DIAGNOSIS — K59.00 CONSTIPATION, UNSPECIFIED CONSTIPATION TYPE: ICD-10-CM

## 2023-01-01 DIAGNOSIS — R09.81 NASAL CONGESTION: ICD-10-CM

## 2023-01-01 DIAGNOSIS — R91.8 LUNG MASS: Primary | ICD-10-CM

## 2023-01-01 DIAGNOSIS — M10.041 ACUTE IDIOPATHIC GOUT OF RIGHT HAND: ICD-10-CM

## 2023-01-01 DIAGNOSIS — Z11.59 ENCOUNTER FOR SCREENING FOR OTHER VIRAL DISEASES: ICD-10-CM

## 2023-01-01 DIAGNOSIS — F19.10 POLYSUBSTANCE ABUSE (H): ICD-10-CM

## 2023-01-01 DIAGNOSIS — B18.2 CHRONIC HEPATITIS C WITHOUT HEPATIC COMA (H): ICD-10-CM

## 2023-01-01 DIAGNOSIS — S93.401A SPRAIN OF RIGHT ANKLE, UNSPECIFIED LIGAMENT, INITIAL ENCOUNTER: Primary | ICD-10-CM

## 2023-01-01 DIAGNOSIS — Z02.89 ENCOUNTER FOR COMPLETION OF FORM WITH PATIENT: ICD-10-CM

## 2023-01-01 DIAGNOSIS — Z59.82 TRANSPORTATION INSECURITY: ICD-10-CM

## 2023-01-01 DIAGNOSIS — F03.A18 MILD DEMENTIA WITH OTHER BEHAVIORAL DISTURBANCE, UNSPECIFIED DEMENTIA TYPE (H): Primary | ICD-10-CM

## 2023-01-01 DIAGNOSIS — M1A.0411 IDIOPATHIC CHRONIC GOUT OF RIGHT HAND WITH TOPHUS: ICD-10-CM

## 2023-01-01 DIAGNOSIS — R94.6 ABNORMAL RESULTS OF THYROID FUNCTION STUDIES: ICD-10-CM

## 2023-01-01 DIAGNOSIS — Z09 ENCOUNTER FOR EXAMINATION FOLLOWING TREATMENT AT HOSPITAL: ICD-10-CM

## 2023-01-01 LAB
ALBUMIN SERPL BCG-MCNC: 3.8 G/DL (ref 3.5–5.2)
ALP SERPL-CCNC: 68 U/L (ref 35–104)
ALT SERPL W P-5'-P-CCNC: 9 U/L (ref 0–50)
ANION GAP SERPL CALCULATED.3IONS-SCNC: 10 MMOL/L (ref 7–15)
AST SERPL W P-5'-P-CCNC: 21 U/L (ref 0–45)
BILIRUB SERPL-MCNC: 0.4 MG/DL
BUN SERPL-MCNC: 19.6 MG/DL (ref 8–23)
CALCIUM SERPL-MCNC: 10.1 MG/DL (ref 8.8–10.2)
CHLORIDE SERPL-SCNC: 105 MMOL/L (ref 98–107)
CREAT SERPL-MCNC: 0.91 MG/DL (ref 0.51–0.95)
DEPRECATED CALCIDIOL+CALCIFEROL SERPL-MC: 7 UG/L (ref 20–75)
DEPRECATED HCO3 PLAS-SCNC: 21 MMOL/L (ref 22–29)
ERYTHROCYTE [DISTWIDTH] IN BLOOD BY AUTOMATED COUNT: 13.8 % (ref 10–15)
FEF 25/75: NORMAL
FEV-1: NORMAL
FEV1/FVC: NORMAL
FLUAV RNA SPEC QL NAA+PROBE: NEGATIVE
FLUBV RNA RESP QL NAA+PROBE: NEGATIVE
FVC: NORMAL
GFR SERPL CREATININE-BSD FRML MDRD: 68 ML/MIN/1.73M2
GLUCOSE SERPL-MCNC: 66 MG/DL (ref 70–99)
HBV CORE AB SERPL QL IA: NONREACTIVE
HBV SURFACE AB SERPL IA-ACNC: 54.07 M[IU]/ML
HBV SURFACE AB SERPL IA-ACNC: REACTIVE M[IU]/ML
HBV SURFACE AG SERPL QL IA: NONREACTIVE
HCT VFR BLD AUTO: 36.8 % (ref 35–47)
HCV GENTYP SERPL NAA+PROBE: NORMAL
HCV RNA SERPL NAA+PROBE-ACNC: ABNORMAL IU/ML
HCV RNA SERPL NAA+PROBE-LOG IU: 5.4 {LOG_IU}/ML
HGB BLD-MCNC: 11.6 G/DL (ref 11.7–15.7)
HIV 1+2 AB+HIV1 P24 AG SERPL QL IA: NONREACTIVE
INR PPP: 1.16 (ref 0.85–1.15)
MCH RBC QN AUTO: 26 PG (ref 26.5–33)
MCHC RBC AUTO-ENTMCNC: 31.5 G/DL (ref 31.5–36.5)
MCV RBC AUTO: 82 FL (ref 78–100)
PLATELET # BLD AUTO: 240 10E3/UL (ref 150–450)
POTASSIUM SERPL-SCNC: 4.7 MMOL/L (ref 3.4–5.3)
PROT SERPL-MCNC: 8 G/DL (ref 6.4–8.3)
RBC # BLD AUTO: 4.47 10E6/UL (ref 3.8–5.2)
RSV RNA SPEC NAA+PROBE: NEGATIVE
SARS-COV-2 RNA RESP QL NAA+PROBE: NEGATIVE
SODIUM SERPL-SCNC: 136 MMOL/L (ref 136–145)
TSH SERPL DL<=0.005 MIU/L-ACNC: 1.11 UIU/ML (ref 0.3–4.2)
TSH SERPL DL<=0.005 MIU/L-ACNC: 1.66 UIU/ML (ref 0.3–4.2)
URATE SERPL-MCNC: 8.6 MG/DL (ref 2.4–5.7)
VIT B12 SERPL-MCNC: 481 PG/ML (ref 232–1245)
VIT B12 SERPL-MCNC: 528 PG/ML (ref 232–1245)
WBC # BLD AUTO: 6.6 10E3/UL (ref 4–11)

## 2023-01-01 PROCEDURE — 87389 HIV-1 AG W/HIV-1&-2 AB AG IA: CPT | Performed by: FAMILY MEDICINE

## 2023-01-01 PROCEDURE — 80053 COMPREHEN METABOLIC PANEL: CPT | Performed by: FAMILY MEDICINE

## 2023-01-01 PROCEDURE — 86704 HEP B CORE ANTIBODY TOTAL: CPT | Performed by: FAMILY MEDICINE

## 2023-01-01 PROCEDURE — 99214 OFFICE O/P EST MOD 30 MIN: CPT | Mod: 25 | Performed by: FAMILY MEDICINE

## 2023-01-01 PROCEDURE — G1010 CDSM STANSON: HCPCS

## 2023-01-01 PROCEDURE — 82607 VITAMIN B-12: CPT

## 2023-01-01 PROCEDURE — 82306 VITAMIN D 25 HYDROXY: CPT

## 2023-01-01 PROCEDURE — 99310 SBSQ NF CARE HIGH MDM 45: CPT | Performed by: NURSE PRACTITIONER

## 2023-01-01 PROCEDURE — 99215 OFFICE O/P EST HI 40 MIN: CPT | Mod: 25 | Performed by: FAMILY MEDICINE

## 2023-01-01 PROCEDURE — 87522 HEPATITIS C REVRS TRNSCRPJ: CPT | Performed by: FAMILY MEDICINE

## 2023-01-01 PROCEDURE — 87902 NFCT AGT GNTYP ALYS HEP C: CPT | Mod: 90 | Performed by: FAMILY MEDICINE

## 2023-01-01 PROCEDURE — 36415 COLL VENOUS BLD VENIPUNCTURE: CPT | Performed by: FAMILY MEDICINE

## 2023-01-01 PROCEDURE — 76705 ECHO EXAM OF ABDOMEN: CPT

## 2023-01-01 PROCEDURE — 99214 OFFICE O/P EST MOD 30 MIN: CPT | Performed by: FAMILY MEDICINE

## 2023-01-01 PROCEDURE — G0009 ADMIN PNEUMOCOCCAL VACCINE: HCPCS | Performed by: FAMILY MEDICINE

## 2023-01-01 PROCEDURE — 85027 COMPLETE CBC AUTOMATED: CPT | Performed by: FAMILY MEDICINE

## 2023-01-01 PROCEDURE — 99214 OFFICE O/P EST MOD 30 MIN: CPT | Mod: GC

## 2023-01-01 PROCEDURE — 90677 PCV20 VACCINE IM: CPT | Performed by: FAMILY MEDICINE

## 2023-01-01 PROCEDURE — 82607 VITAMIN B-12: CPT | Performed by: FAMILY MEDICINE

## 2023-01-01 PROCEDURE — 87637 SARSCOV2&INF A&B&RSV AMP PRB: CPT

## 2023-01-01 PROCEDURE — 96139 PSYCL/NRPSYC TST TECH EA: CPT

## 2023-01-01 PROCEDURE — 84443 ASSAY THYROID STIM HORMONE: CPT

## 2023-01-01 PROCEDURE — 96132 NRPSYC TST EVAL PHYS/QHP 1ST: CPT

## 2023-01-01 PROCEDURE — 77081 DXA BONE DENSITY APPENDICULR: CPT

## 2023-01-01 PROCEDURE — 94010 BREATHING CAPACITY TEST: CPT | Performed by: FAMILY MEDICINE

## 2023-01-01 PROCEDURE — 99495 TRANSJ CARE MGMT MOD F2F 14D: CPT | Mod: GC | Performed by: STUDENT IN AN ORGANIZED HEALTH CARE EDUCATION/TRAINING PROGRAM

## 2023-01-01 PROCEDURE — 86706 HEP B SURFACE ANTIBODY: CPT | Performed by: FAMILY MEDICINE

## 2023-01-01 PROCEDURE — 87340 HEPATITIS B SURFACE AG IA: CPT | Performed by: FAMILY MEDICINE

## 2023-01-01 PROCEDURE — 96138 PSYCL/NRPSYC TECH 1ST: CPT

## 2023-01-01 PROCEDURE — 36415 COLL VENOUS BLD VENIPUNCTURE: CPT

## 2023-01-01 PROCEDURE — 84550 ASSAY OF BLOOD/URIC ACID: CPT | Performed by: FAMILY MEDICINE

## 2023-01-01 PROCEDURE — 77067 SCR MAMMO BI INCL CAD: CPT

## 2023-01-01 PROCEDURE — 84443 ASSAY THYROID STIM HORMONE: CPT | Performed by: FAMILY MEDICINE

## 2023-01-01 PROCEDURE — 96133 NRPSYC TST EVAL PHYS/QHP EA: CPT

## 2023-01-01 PROCEDURE — 99000 SPECIMEN HANDLING OFFICE-LAB: CPT | Performed by: FAMILY MEDICINE

## 2023-01-01 PROCEDURE — 85610 PROTHROMBIN TIME: CPT | Performed by: FAMILY MEDICINE

## 2023-01-01 PROCEDURE — 77080 DXA BONE DENSITY AXIAL: CPT | Mod: XU

## 2023-01-01 PROCEDURE — 90791 PSYCH DIAGNOSTIC EVALUATION: CPT

## 2023-01-01 RX ORDER — METHADONE HYDROCHLORIDE 10 MG/ML
53 CONCENTRATE ORAL DAILY
Qty: 60 ML | Refills: 0 | Status: SHIPPED | OUTPATIENT
Start: 2023-01-01

## 2023-01-01 RX ORDER — LOSARTAN POTASSIUM 50 MG/1
50 TABLET ORAL DAILY
Qty: 90 TABLET | Refills: 3 | Status: SHIPPED | OUTPATIENT
Start: 2023-01-01 | End: 2023-01-01

## 2023-01-01 RX ORDER — ONDANSETRON 4 MG/1
4 TABLET, ORALLY DISINTEGRATING ORAL
COMMUNITY
Start: 2023-01-01 | End: 2023-01-01

## 2023-01-01 RX ORDER — LISINOPRIL AND HYDROCHLOROTHIAZIDE 12.5; 2 MG/1; MG/1
2 TABLET ORAL DAILY
Qty: 180 TABLET | Refills: 3 | Status: SHIPPED | OUTPATIENT
Start: 2023-01-01 | End: 2023-01-01 | Stop reason: ALTCHOICE

## 2023-01-01 RX ORDER — POLYETHYLENE GLYCOL 3350 17 G/17G
1 POWDER, FOR SOLUTION ORAL 2 TIMES DAILY
Qty: 850 G | Refills: 3 | Status: SHIPPED | OUTPATIENT
Start: 2023-01-01

## 2023-01-01 RX ORDER — ALBUTEROL SULFATE 90 UG/1
2 AEROSOL, METERED RESPIRATORY (INHALATION) EVERY 6 HOURS PRN
Qty: 6.7 G | Refills: 1 | Status: SHIPPED | OUTPATIENT
Start: 2023-01-01

## 2023-01-01 RX ORDER — LIDOCAINE 50 MG/G
1 PATCH TOPICAL DAILY PRN
Qty: 5 PATCH | Refills: 0 | Status: SHIPPED | OUTPATIENT
Start: 2023-01-01 | End: 2023-01-01

## 2023-01-01 RX ORDER — PREDNISONE 20 MG/1
40 TABLET ORAL DAILY
Qty: 14 TABLET | Refills: 0 | Status: SHIPPED | OUTPATIENT
Start: 2023-01-01 | End: 2023-01-01

## 2023-01-01 RX ORDER — ALBUTEROL SULFATE 90 UG/1
2 AEROSOL, METERED RESPIRATORY (INHALATION) EVERY 6 HOURS PRN
Qty: 8 G | Refills: 0 | Status: SHIPPED | OUTPATIENT
Start: 2023-01-01 | End: 2023-01-01

## 2023-01-01 RX ORDER — METHADONE HYDROCHLORIDE 10 MG/1
5.3 TABLET ORAL ONCE
COMMUNITY
End: 2023-01-01

## 2023-01-01 RX ORDER — IPRATROPIUM BROMIDE AND ALBUTEROL SULFATE 2.5; .5 MG/3ML; MG/3ML
1 SOLUTION RESPIRATORY (INHALATION) EVERY 6 HOURS PRN
COMMUNITY

## 2023-01-01 RX ORDER — ALLOPURINOL 100 MG/1
100 TABLET ORAL DAILY
Qty: 90 TABLET | Refills: 3 | Status: SHIPPED | OUTPATIENT
Start: 2023-01-01

## 2023-01-01 RX ORDER — CEPHALEXIN 500 MG/1
CAPSULE ORAL
COMMUNITY
Start: 2023-01-01 | End: 2023-01-01

## 2023-01-01 RX ORDER — ALBUTEROL SULFATE 90 UG/1
2 AEROSOL, METERED RESPIRATORY (INHALATION) EVERY 6 HOURS PRN
Qty: 6.7 G | Refills: 1 | Status: SHIPPED | OUTPATIENT
Start: 2023-01-01 | End: 2023-01-01

## 2023-01-01 RX ORDER — LISINOPRIL AND HYDROCHLOROTHIAZIDE 12.5; 2 MG/1; MG/1
2 TABLET ORAL DAILY
Qty: 180 TABLET | Refills: 3 | Status: SHIPPED | OUTPATIENT
Start: 2023-01-01 | End: 2023-01-01

## 2023-01-01 RX ORDER — LOSARTAN POTASSIUM 50 MG/1
50 TABLET ORAL DAILY
Qty: 90 TABLET | Refills: 3 | Status: SHIPPED | OUTPATIENT
Start: 2023-01-01

## 2023-01-01 RX ORDER — METHADONE HYDROCHLORIDE 10 MG/ML
5.3 CONCENTRATE ORAL DAILY
COMMUNITY
End: 2023-01-01

## 2023-01-01 RX ORDER — FLUTICASONE PROPIONATE AND SALMETEROL XINAFOATE 45; 21 UG/1; UG/1
2 AEROSOL, METERED RESPIRATORY (INHALATION) 2 TIMES DAILY
COMMUNITY

## 2023-01-01 RX ORDER — NICOTINE 21 MG/24HR
1 PATCH, TRANSDERMAL 24 HOURS TRANSDERMAL EVERY 24 HOURS
COMMUNITY

## 2023-01-01 RX ORDER — LISINOPRIL 30 MG/1
TABLET ORAL
COMMUNITY
Start: 2023-01-01 | End: 2023-01-01 | Stop reason: ALTCHOICE

## 2023-01-01 SDOH — ECONOMIC STABILITY - TRANSPORTATION SECURITY: TRANSPORTATION INSECURITY: Z59.82

## 2023-01-12 PROBLEM — R41.3 MEMORY LOSS: Status: ACTIVE | Noted: 2023-01-01

## 2023-01-12 NOTE — LETTER
[facility] [address] [place] [address]  Phone: [phone]  Fax: [phone]    January 12, 2023        Demetra Amin  [address]   [place] [address]          To whom it may concern:    RE: Demetra Amin    She was seen in clinic today for memory concerns, planning for follow-up visits and work-up for these concerns.    Please contact me for questions or concerns.      Sincerely,        Minal Garcia MD

## 2023-01-12 NOTE — LETTER
January 16, 2023      Demetra L Eloisa  [address]   [place] [address]        Dear ,    We are writing to inform you of your test results.    We did a work-up for your memory and your vitamin D was very low which could be contributing to your symptoms. It would be good to start a vitamin D supplement, would that be okay for me to send to your pharmacy to  and start taking daily? Your other labs were normal such as normal Vitamin B12 and normal thyroid lab which is good.     Let me know if you have any questions and if I can send the Vitamin D over. Your next appointment is 1/26/23 so Dr. Chacko will check in with you at that time.     Take care.       Resulted Orders   Vitamin B12   Result Value Ref Range    Vitamin B12 528 232 - 1,245 pg/mL   Vitamin D deficiency screening   Result Value Ref Range    Vitamin D, Total (25-Hydroxy) 7 (L) 20 - 75 ug/L    Narrative    Season, race, dietary intake, and treatment affect the concentration of 25-hydroxy-Vitamin D. Values may decrease during winter months and increase during summer months. Values 20-29 ug/L may indicate Vitamin D insufficiency and values <20 ug/L may indicate Vitamin D deficiency.    Vitamin D determination is routinely performed by an immunoassay specific for 25 hydroxyvitamin D3.  If an individual is on vitamin D2(ergocalciferol) supplementation, please specify 25 [place] vitamin D2 and D3 level determination by LCMSMS test VITD23.     TSH with free T4 reflex   Result Value Ref Range    TSH 1.66 0.30 - 4.20 uIU/mL       If you have any questions or concerns, please call the clinic at the number listed above.       Sincerely,      Minal Garcia MD
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
## 2023-01-13 NOTE — ADDENDUM NOTE
Addended by: MIRI SALDIVAR on: 1/13/2023 06:27 AM     Modules accepted: Orders     Alert-The patient is alert, awake and responds to voice. The patient is oriented to time, place, and person. The triage nurse is able to obtain subjective information.

## 2023-01-16 NOTE — PATIENT INSTRUCTIONS
January 17, 2023  [facility]. [address]  [address] [place] [address]    Phone: [phone]  Fax: [phone]    Fax referral, demographic, notes, & meds to [phone], they will review referral and contact pt for an appt.     Laxmi Tavares

## 2023-02-14 NOTE — TELEPHONE ENCOUNTER
2/14/2023: Care Coordination       CC: called [facility], [facility] for a Neuro-Psychological Assessment. I was asked to print the referral and fax it to: [phone]. Our referral coordinator is out of the clinic for the rest of the week. Therefore, I faxed the referral for the patient. The clinic stated that they would be in contact with the patient.          Kaleb Haas Sr.  Social Work  Care Coordination  [address] [address]  [email or web address]   Office: [phone]  Direct: [phone]  [facility]

## 2023-04-13 NOTE — PROGRESS NOTES
Assessment & Plan     #Transportation insecurity  Patient presents with family including granddaughter, daughter, son due to patient and family being overwhelmed by cares at home, difficulty with follow-up, wanting to establish frequent follow-up visits and get resources for further care.  History and evaluation was limited due to significant distress from patient and family, presenting late to appointment, unfortunately there was minimal time to discuss symptoms with patient.  Patient reports no acute safety concerns such as no chest pain, no SI, patient is safe at home.  Patient is medically complex, multiple ED visits in the last 2 months including difficulties with transportation to methadone clinic.  Family reports the priority is getting help with transportation.  Contact is daughter however patient lives with granddaughter, new living situation.  -Discussed with SW who was not in person in the clinic for assistance with transportation/medical rides  -Discussed with SW, will prepare resources for next visit around nursing home options  -Follow-up next week with Pharm.D., family encouraged to bring all medications  -Will discuss with Pharm.D. and SW to coordinate cares next week, ideally patient to be visited in person by SW as well to discuss living options  -Follow-up in 2 weeks with me    #Memory loss  Patient with ongoing concerns for memory loss, was last seen 1/2023 for this.  At that time patient reported this had been going on for 5 or more years and slowly worsening, she would forget that she is cooking and had sprinklers go off due to this.  Has gotten lost on the bus.  Continues with methadone and no other substances including no alcohol.  On exam patient is intermittently tearful, has flight of ideas, is very tangential.  Previous Mini cog score of 3, clock draw was normal, could recall 1 of 3 words.  Work-up at that time was significant for low vitamin D though otherwise normal including normal  CBC, normal TSH, normal B12.  Reviewed this with patient today in clinic.  Neuropsych testing previously ordered and reviewed with patient that this is now scheduled for  8/15/2023.      #Essential hypertension, benign  Patient reports she has not been taking her medications, she is not sure what to take.  Granddaughter reports she will bring all medications to next visit.  In chart listed as lisinopril-hydrochlorothiazide though with recent lisinopril only prescribed.  Due to patient late to visit and focusing on transportation issues, plan to digging into this history further at her upcoming visits.  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 Granddaughter is caretaker. Wanting to get resources.  Feels very overwhelmed by social and medical needs, priority is getting transportation so she can do more frequent visits, also priority of resources for nursing home/other living facility/increased cares.  Patient continued to be overwhelmed with memory concerns, no acute changes though ongoing issues with memory.  This is also noted by family.  No acute safety concerns such as no chest pain, no SI, feels safe at home.  They are not sure about medications, not sure all medications are.        4/13/2023     4:32 PM   Additional Questions   Roomed by ngf   Accompanied by self 2 daughter and 1 son     Forms 4/13/2023   Any forms needing to be completed Yes         4/13/2023     4:32 PM   Patient Reported Additional Medications   Patient reports taking the following new medications none         Review of Systems   As above      Objective    BP (!) 163/78 (BP Location: Left arm, Patient Position: Sitting, Cuff Size: Adult Regular)   Pulse 93   Resp 14    Wt 62.8 kg (138 lb 6.4 oz)   SpO2 96%   Breastfeeding No   BMI 22.34 kg/m    Body mass index is 22.34 kg/m .  Physical Exam  Constitutional:       Appearance: She is not diaphoretic.   Eyes:      Extraocular Movements: Extraocular movements intact.      Conjunctiva/sclera: Conjunctivae normal.   Pulmonary:      Effort: Pulmonary effort is normal. No respiratory distress.   Musculoskeletal:      Cervical back: Normal range of motion.      Right lower leg: No edema.      Left lower leg: No edema.   Skin:     General: Skin is warm and dry.   Neurological:      General: No focal deficit present.      Mental Status: She is alert and oriented to person, place, and time.      Comments: NAD, moving all 4 limbs equally bilaterally   Psychiatric:         Attention and Perception: Attention normal.         Mood and Affect: Mood is anxious. Affect is tearful.         Speech: Speech is tangential.         Behavior: Behavior is cooperative.         Thought Content: Thought content does not include suicidal ideation.

## 2023-04-14 NOTE — TELEPHONE ENCOUNTER
4/14/2023: Care Coordination    Transportation Request by: Dr. Garcia    CC: arranged transportation for two up coming in person appointments the patient has on:    4/17/13 @ 3:10 pm  [employer] will arrive at: 2:30 pm  When the patient is ready to return home, she will need activate the will call service by calling [employer] at: [phone]    4/24/23 @ 9:00 am  [employer] will arrive at: 8:20 am  When the patient is ready to return home, she will need to activate the will call service by calling [employer] at:[phone].    The patient has been called with [employer].        Kaleb Haas Sr.  Social Work  Care Coordination  [address] [address]  [email or web address]   Office: [phone]  Direct: [phone]  [facility]

## 2023-06-07 NOTE — PROGRESS NOTES
Preceptor Attestation:  I discussed the patient with the resident and evaluated the patient in person. I have verified the content of the note, which accurately reflects my assessment of the patient and the plan of care.  Supervising Physician:  Anika Benites MD.

## 2023-06-07 NOTE — PROGRESS NOTES
Assessment & Plan   72 years old females presented to the clinic after an ED visit on 6/2.  Patient was diagnosed with a right ankle sprain after twisting her ankle.  Overall patient states he drinks improved, swelling is better.  Patient has been using Tylenol 3, cold/hot compression with some help.  Patient requested to have a Tylenol 3 prescription, would use a different medications.  Patient was not happy with the choice for her certification generalized.  Patient consulted over  using methadone and oxycodone at the same time.  Also, patient was recommended to keep her feet.  Essential hypertension, benign    - lisinopril-hydrochlorothiazide (ZESTORETIC) 20-12.5 MG tablet  Dispense: 180 tablet; Refill: 3    Sprain of right ankle, unspecified ligament, initial encounter    - diclofenac (VOLTAREN) 1 % topical gel  Dispense: 350 g; Refill: 0  - lidocaine (LIDODERM) 5 % patch  Dispense: 5 patch; Refill: 0    Encounter for examination following treatment at hospital        Review of external notes as documented elsewhere in note  No LOS data to display   Time spent by me doing chart review, history and exam, documentation and further activities per the note       See Patient Instructions    No follow-ups on file.    Dimitri Spencer MD  [facility] NOÉ Mccrary is a 72 year old, presenting for the following health issues:  Trauma (Ck R ankle sprain- was seen in the ER- no broken bones- would like to get pain med)        6/7/2023     8:04 AM   Additional Questions   Roomed by Jonh   Accompanied by self     HPI       Hospital Follow-up Visit:    Hospital/Nursing Home/IP Rehab Facility: [facility]  Date of Admission: 6/2  Date of Discharge: 6/2  Reason(s) for Admission: Ankle sprain    Was your hospitalization related to COVID-19? No   Problems taking medications regularly:  None  Medication changes since discharge: None  Problems adhering to non-medication therapy: Patient was  recommended to keep her feet raise, she lives by herself and she needed to move around    Summary of hospitalization:  CareEverywhere information obtained and reviewed  Diagnostic Tests/Treatments reviewed.  Follow up needed: none  Other Healthcare Providers Involved in Patient s Care:         None  Update since discharge: improved.         Plan of care communicated with patient               Review of Systems   Constitutional, HEENT, cardiovascular, pulmonary, GI, , musculoskeletal, neuro, skin, endocrine and psych systems are negative, except as otherwise noted.      Objective    BP (!) 144/80   Pulse 77   Temp 99.2  F (37.3  C) (Oral)   Resp 16   Wt 69.9 kg (154 lb)   SpO2 94%   BMI 24.86 kg/m    Body mass index is 24.86 kg/m .  Physical Exam   GENERAL: healthy, alert and no distress  NECK: no adenopathy, no asymmetry, masses, or scars and thyroid normal to palpation  RESP: lungs clear to auscultation - no rales, rhonchi or wheezes  CV: regular rate and rhythm, normal S1 S2, no S3 or S4, no murmur, click or rub, no peripheral edema and peripheral pulses strong  ABDOMEN: soft, nontender, no hepatosplenomegaly, no masses and bowel sounds normal  MS: Swelling with some tenderness to touch on the lateral side of the right foot, full range of motion with intact sensation, no erythema   SKIN: no suspicious lesions or rashes  NEURO: Normal strength and tone, mentation intact and speech normal    None    ----- Service Performed and Documented by Resident or Fellow ------

## 2023-06-08 NOTE — TELEPHONE ENCOUNTER
lidocaine (LIDODERM) 5 % patch is not covered, please advise and change to something else other need to do PA. Thanks

## 2023-06-27 NOTE — PATIENT INSTRUCTIONS
Please follow up in the clinic in 2-3 weeks.    We checked blood tests for Hepatitis C to see if you can get treated for this.    We changed the blood pressure pill from lisinopril to losartan which also helps with gout as well as blood pressure.

## 2023-06-27 NOTE — PROGRESS NOTES
Assessment & Plan     Chronic hepatitis C without hepatic coma (H)  She might qualify for treatment with Mavyret for hepatitis C.  We will get the initial labs for that.  - Hepatitis C RNA, Quantitative by PCR with Confirmatory Reflex to Genotyping; Future  - HIV Antigen Antibody Combo Cascade; Future  - Hepatitis B surface antigen; Future  - Hepatitis B Surface Antibody; Future  - Hepatitis B core antibody; Future  - Comprehensive metabolic panel; Future  - CBC with platelets; Future  - INR; Future  - Hepatitis C RNA, Quantitative by PCR with Confirmatory Reflex to Genotyping  - HIV Antigen Antibody Combo Cascade  - Hepatitis B surface antigen  - Hepatitis B Surface Antibody  - Hepatitis B core antibody  - Comprehensive metabolic panel  - CBC with platelets  - INR    Essential hypertension  I am wondering if she has gout.  She is an elderly woman with hypertension and history of alcohol use disorder.  We will check a uric acid and treat her empirically with prednisone.  - Uric acid; Future  - Basic metabolic panel; Future  - losartan (COZAAR) 50 MG tablet; Take 1 tablet (50 mg) by mouth daily  - Uric acid    Encounter for screening for other viral diseases    - Hepatitis B Surface Antibody; Future  - Hepatitis B core antibody; Future  - Hepatitis B Surface Antibody  - Hepatitis B core antibody    Acute idiopathic gout of right hand    - predniSONE (DELTASONE) 20 MG tablet; Take 2 tablets (40 mg) by mouth daily for 7 days    We will need to have a subsequent visit first to discuss her cognition this is seems to be a more pressing problem.  She will probably need TSH, VDRL, B12 and imaging of the brain.    At a subsequent visit we can evaluate her to see if she would qualify for antiviral therapy for hepatitis C.    Finally, we will continue to work on her other comorbidities including hypertension and she will need to have lipids checked, etc.    Diagnosis or treatment significantly limited by social determinants  of health - Cognitive problems and low health literacy.  35 minutes spent by me on the date of the encounter doing chart review, patient visit, documentation and Filling out paperwork.          Guzman Jane MD  [facility] NOÉ Mccrary is a 72 year old, presenting for the following health issues:  Swelling (Right hand swollen, left hand swollen some also.) and Forms (Has a form needing to be filled out.  Patient is moving to an assisted living.)    She is needing a form filled out for assisted living.  She is having increasing memory problems and gets confused.  She has a PCA who takes care of her at home and also goes with her to all of her appointments.  She needs help remembering to take her medications.    She has never had an evaluation for this her memory problems so this will need to be addressed at a future visit.    She is having pain in the hands.  She is particularly noticing pain and swelling in the left hand.  Looks like she has been seen in the emergency department a few times for this and I see results of a negative CRP and a ESR elevated 217.  At 1 point she was given ibuprofen for this.  At another time she was given a diagnosis of cellulitis and treated with antibiotics.  X-rays were obtained showing degenerative arthritis and diffuse bony demineralization.    Of note she has chronic hepatitis C and she has never been treated for this.  It looks like in the past she had had a problem with alcohol use disorder and polysubstance abuse but she states she is not used any drugs or drink alcohol for at least 3 years.      6/27/2023    11:37 AM   Additional Questions   Roomed by Ramona     Forms 6/27/2023   Any forms needing to be completed Yes       Review of Systems   Constitutional, HEENT, cardiovascular, pulmonary, gi and gu systems are negative, except as otherwise noted.  No urinary or fecal incontinence.      Objective    BP (!) 154/81   Pulse 72   Temp 98.6   F (37  C)   Resp 16   Wt 67.3 kg (148 lb 6.4 oz)   SpO2 96%   BMI 23.95 kg/m    Body mass index is 23.95 kg/m .  Physical Exam   GENERAL: healthy, alert and no distress  MS: Swelling of the dorsum of the left hand and marked Heberden and Bianca nodes of the digits of both hands but much more so on the left.

## 2023-06-27 NOTE — LETTER
June 29, 2023      Demetra Amin  [address]   [place] [address]        Dear ,    We are writing to inform you of your test results.    You do have gout.  I hope the medicine is helping.  Please follow-up in the next few weeks regarding your memory and your hepatitis.     Resulted Orders   Uric acid   Result Value Ref Range    Uric Acid 8.6 (H) 2.4 - 5.7 mg/dL   Hepatitis C RNA, Quantitative by PCR with Confirmatory Reflex to Genotyping   Result Value Ref Range    Hepatitis C log 5.4     Hepatitis C RNA IU/mL, Instrument 249,000 (H) <=1 IU/mL    Narrative    The valencia  Hepatitis C assay is an FDA-approved in vitro nucleic acid amplification test for the quantification of Hepatitis C virus (HCV) RNA in human EDTA plasma or serum, using the Roche valencia  6800 instrument for automated viral nucleic acid extraction, purification, amplification, and detection of the viral nucleic acid target. This assay utilizes dual probes to detect and quantify, but not discriminate genotypes 1-6. The test is intended for use as an aid in the diagnosis of HCV infection and an aid in the management of HCV-infected patients undergoing anti-viral therapy. Titer results are reported in IU/mL.   Hepatitis B surface antigen   Result Value Ref Range    Hepatitis B Surface Antigen Nonreactive Nonreactive   Hepatitis B Surface Antibody   Result Value Ref Range    Hepatitis B Surface Antibody Instrument Value 54.07 <8.00 m[IU]/mL    Hepatitis B Surface Antibody Reactive       Comment:      Patient is considered to be immune to infection with hepatitis B when the value is greater than or equal to 12.00 mIU/mL.   Comprehensive metabolic panel   Result Value Ref Range    Sodium 136 136 - 145 mmol/L    Potassium 4.7 3.4 - 5.3 mmol/L    Chloride 105 98 - 107 mmol/L    Carbon Dioxide (CO2) 21 (L) 22 - 29 mmol/L    Anion Gap 10 7 - 15 mmol/L    Urea Nitrogen 19.6 8.0 - 23.0 mg/dL    Creatinine 0.91 0.51 - 0.95 mg/dL     Calcium 10.1 8.8 - 10.2 mg/dL    Glucose 66 (L) 70 - 99 mg/dL    Alkaline Phosphatase 68 35 - 104 U/L    AST 21 0 - 45 U/L      Comment:      Reference intervals for this test were updated on 6/12/2023 to more accurately reflect our healthy population. There may be differences in the flagging of prior results with similar values performed with this method. Interpretation of those prior results can be made in the context of the updated reference intervals.    ALT 9 0 - 50 U/L      Comment:      Reference intervals for this test were updated on 6/12/2023 to more accurately reflect our healthy population. There may be differences in the flagging of prior results with similar values performed with this method. Interpretation of those prior results can be made in the context of the updated reference intervals.      Protein Total 8.0 6.4 - 8.3 g/dL    Albumin 3.8 3.5 - 5.2 g/dL    Bilirubin Total 0.4 <=1.2 mg/dL    GFR Estimate 68 >60 mL/min/1.73m2   CBC with platelets   Result Value Ref Range    WBC Count 6.6 4.0 - 11.0 10e3/uL    RBC Count 4.47 3.80 - 5.20 10e6/uL    Hemoglobin 11.6 (L) 11.7 - 15.7 g/dL    Hematocrit 36.8 35.0 - 47.0 %    MCV 82 78 - 100 fL    MCH 26.0 (L) 26.5 - 33.0 pg    MCHC 31.5 31.5 - 36.5 g/dL    RDW 13.8 10.0 - 15.0 %    Platelet Count 240 150 - 450 10e3/uL   INR   Result Value Ref Range    INR 1.16 (H) 0.85 - 1.15       If you have any questions or concerns, please call the clinic at the number listed above.       Sincerely,      Guzman Jane MD

## 2023-06-28 NOTE — RESULT ENCOUNTER NOTE
You do have gout.  I hope the medicine is helping.  Please follow-up in the next few weeks regarding your memory and your hepatitis.

## 2023-07-14 NOTE — PROGRESS NOTES
Assessment & Plan     Vitamin D deficiency, unspecified    - DEXA HIP/PELVIS/SPINE - Future; Future    Chronic obstructive pulmonary disease, unspecified COPD type (H)      Screen for colon cancer    - Fecal colorectal cancer screen FIT - Future (S+30); Future    Visit for screening mammogram    - MA SCREENING DIGITAL BILAT - Future  (s+30); Future    Chronic hepatitis C without hepatic coma (H)  Her fib 4 score is high enough that we need to do a FibroScan before we can start therapy with Mavyret.  We will get that and then plan on doing an official hepatitis C treatment visit.  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  As she is on chronic methadone I think she needs to be on MiraLAX daily.  - polyethylene glycol (MIRALAX) 17 GM/Dose powder; Take 17 g (1 Capful) by mouth 2 times daily    Diagnosis or treatment significantly limited by social determinants of health - Limited income and low health literacy.  Prescription drug management             No follow-ups on file.    Guzman Jane MD  [facility] NOÉ Mccrary is a 72 year old, presenting for the following health issues:  Other (Constipated on and off fo rthe past few years /Follow up gouches and is feeling slightly better/Treatment for Hep C )    Had a full bottle of mag citrate and still has constipation.  She states this is a new problem but I think what is new is that she is having as much of a problem that she does have.  She is on chronic methadone and does not have a bowel regimen that she normally uses.    At the last visit she clinically had gout and her uric acid level did come  back quite elevated.  She does have what looks like tophi of the DIP joints of some of her fingers.  She got better after she took the prednisone and we wanted to talk about allopurinol.    Patient has hepatitis C and we did the routine labs that we need.  She is immune to hepatitis B and does not have active hepatitis B she has been immunized against both hep a and B.  HIV is negative.  We got the quant and the genotype.  Her fib 4 score is high enough that we need to do a FibroScan before we can start therapy with Mavyret.  We will get that and then plan on doing an official hepatitis C treatment visit.        6/27/2023    11:37 AM   Additional Questions   Roomed by Ramona       Review of Systems   Constitutional, HEENT, cardiovascular, pulmonary, gi and gu systems are negative, except as otherwise noted.      Objective    BP (!) 146/87 (BP Location: Right arm, Patient Position: Sitting, Cuff Size: Adult Regular)   Pulse 75   Temp 99.1  F (37.3  C) (Oral)   Resp 16   Wt 68 kg (150 lb)   SpO2 94%   BMI 24.21 kg/m    Body mass index is 24.21 kg/m .  Physical Exam   GENERAL: healthy, alert and no distress  RESP: lungs clear to auscultation - no rales, rhonchi or wheezes  CV: regular rate and rhythm, normal S1 S2, no S3 or S4, no murmur, click or rub, no peripheral edema and peripheral pulses strong  MS: Significant Bianca nodules on the fingers and also it looks like tophi.

## 2023-07-14 NOTE — PATIENT INSTRUCTIONS
Take allopurinol to prevent gout and to help get rid of the bumps on your knuckles.    Get a liver test that is needed before we treat hepatitis C.    Take the powder, mixed in juice or water, twice a day til you have a bowel movement and then once a day forever.    Come back and see me in 2-4 weeks.

## 2023-07-21 PROBLEM — M1A.0421 IDIOPATHIC CHRONIC GOUT OF LEFT HAND WITH TOPHUS: Status: ACTIVE | Noted: 2023-01-01

## 2023-07-21 NOTE — PROGRESS NOTES
Assessment & Plan     Patient has evidence of cognitive impairment and likely dementia.  Her Carriere was 10 out of 30.  She is distractible and has very limited short-term memory.  We will check a TSH and a B12 but also get imaging of the brain.      Chronic hepatitis C without hepatic coma (H)  Need for prophylactic vaccination against hepatitis A  Patient has history of Hepatitis C. Have patient vaccinated with Hepatitis A.  Were planning on starting Mavyret for this patient pending the results of her liver ultrasound and FibroScan.  - hepatitis A vaccine (VAQTA) 50 UNIT/ML injection; Inject 1 mL (50 Units) into the muscle once for 1 dose    Chronic obstructive pulmonary disease, unspecified COPD type (H)  Spirometry shows moderate obstructive disease and low volumes consistent with COPD.  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  Prescription drug management  45 minutes spent by me on the date of the encounter doing chart review, patient visit and documentation          Guzman Jane MD  [facility]      Ivon Mccrary is a 72 year old, presenting for the following health issues:  Memory Loss and Formulary Issue (Miralax powder not covered- needs alternative)        7/21/2023     9:02 AM   Additional Questions   Roomed by Tesfaye   Accompanied by self and      HPI       Mentation  Memory loss has been occurring for the past year. Has been  "worsening. Forgets her doctors appointments. Beginning to lose her memories. Loses her keys. Forgets her wallet often. Takes a long time to find small objects like a five dollar bill.     Is able to remember her uncles.    Quickly distracted on recall for Smuit Cognitive Assessment. Could not remember the words after a 30 second distraction.     Family History: Maternal Uncle \"Maxim\" had memory loss.     Constipation  Patient mentions constipation due to taking methadone from methadone clinic. Former heroin usage but stopped. [id number]}    Smoking  Current tobacco use. Pack per day for the past 63 years.     Medications  Medications of Risk:   Patient has Opioid in Med List    Patient has no Benzodiazepines on Med List   Patient has no Antipsychotics on Med List    Number of medications on med list:   Med List Contains Less than 10 Medications     PHQ-2 Score:       6/27/2023    11:41 AM 4/13/2023     4:19 PM   PHQ-2 ( 1999 Pf[employer])   Q1: Little interest or pleasure in doing things 1    Q2: Feeling down, depressed or hopeless 3    PHQ-2 Score 4    PHQ-2 Score  Incomplete      PHQ9x3       4/30/2013     8:40 AM 5/31/2013     8:20 AM 7/10/2013     9:00 AM   PHQ-9 SCORE   PHQ-9 Total Score 22 14 5     Mini Cog:        No data to display                Mobility  Refreshable SmartLink to last Falls Risk assessment score and date:   No data recorded   Does Patient or Caregiver have mobility concerns for the patient?: No    General Risk Score: 7   Values used to calculate this score:    Points  Metrics       1        Age: 72       0        Hospital Admissions: 0       3        ED Visits: 7       1        Has Chronic Obstructive Pulmonary Disease: Yes       0        Has Diabetes: No       0        Has Congestive Heart Failure: No       1        Has Liver Disease: Yes       0        Has Depression: No       0        Current PCP: Mariah Chacko MD       1        Has Medicaid: Yes          Objective    BP (!) 158/79   Pulse 79  "  Temp 98.8  F (37.1  C) (Oral)   Resp 12   Wt 67 kg (147 lb 9.6 oz)   SpO2 96%   BMI 23.82 kg/m    Body mass index is 23.82 kg/m .  Physical Exam   GENERAL: healthy, alert and no distress  RESP: wheezing in bilateral lungs  CV: regular rate and rhythm, normal S1 S2,  edema and peripheral pulses strong  MoCA 10/30

## 2023-07-21 NOTE — TELEPHONE ENCOUNTER
polyethylene glycol (MIRALAX) 17 GM/Dose powder is not covered under pt's insurance, she  is not willing to buy it over the counter. Needs PA or send in alternative.

## 2023-07-21 NOTE — LETTER
July 25, 2023      Demetra ARMOND Eloisa  [address]   [place] [address]        Dear ,    We are writing to inform you of your test results.    These results are within the normal range for you.  Please follow up in the clinic as directed.     Resulted Orders   Vitamin B12   Result Value Ref Range    Vitamin B12 481 232 - 1,245 pg/mL   TSH with free T4 reflex   Result Value Ref Range    TSH 1.11 0.30 - 4.20 uIU/mL       If you have any questions or concerns, please call the clinic at the number listed above.       Sincerely,      Guzman Jane MD

## 2023-08-01 NOTE — TELEPHONE ENCOUNTER
8/1/2023: Care Coordination     CC: Reminder call of upcoming appointments. Spoke with patients daughter who reported that they both have medical appointments / procedures on the same date. She stated that her mother knows of her testing that morning and that she zelaya's not need a ride. She stated that she is going to see her mother later this morning and will remind her anyway's.          Kaleb Haas Sr.  Social Work  Care Coordination  [address] [address]  [email or web address]   Office: [phone]  Direct: [phone]  [facility]

## 2023-08-15 NOTE — LETTER
8/15/2023      RE: Demetra Amin  [address]  [place] [address]       NEUROPSYCHOLOGICAL EVALUATION    RELEVANT HISTORY AND REASON FOR REFERRAL    Demetra Amin is a 72 year old right handed, disabled woman with 11 years of formal education and a GED. Information was obtained via interview with the patient and her worker, and review of her medical records. Records indicate a history of cognitive impairment and likely dementia. She is described as distractible with limited short-term memory, worsening over the last year. She has lost her keys, wallet, and $5 bills.. At a 7/21/2023 family medicine visit, she was noted to have a MoCA of 10/30.  She also has a history of hepatitis C, and COPD. She was referred for neuropsychological evaluation by Guzman Jane M.D., for further characterization of any cognitive difficulties, and to evaluate mood and personality.     A family medicine visit note from 4/13/2023 was reviewed.  She presented to the clinic with her granddaughter, daughter, and son, due to to the patient and family being overwhelmed by cares at home, difficulty with follow-up, wanting to establish frequent follow-up visits, and get resources for further care.  She was noted to be medically complex with multiple emergency department visits in the last 2 months including difficulties with transportation to the methadone clinic.  It was noted that she was living with her granddaughter in a new living situation.  Regarding the memory loss, it was noted that she had previously been seen in January 2023 for this and at the time she reported that it has been going on for 5 or more years and was slowly worsening.  She would forget that she was cooking and had sprinklers go off.  She had gotten lost on the bus.  She was noted to continue with methadone with no other substances including including no alcohol.  On exam she was intermittently tearful, had flight of ideas, and was tangential.  A   was consulted for assistance with transportation and medical rides, and to prepare resources to review at a future visit regarding nursing home options.  The January 12, 2023 note also mention multiple ED visits in the last 2 years with multiple care gaps.    CLINICAL INTERVIEW FINDINGS    Upon interview, Ms. Amin acknowledged that she has had progressive difficulty with cognition over the last 6 months or so.  Her worker stated that he has known her for 6 months after she was referred through her medical case manager, in order to help with day-to-day living such as doctors appointments and other follow-ups. She was apparently having greater difficulty accomplishing these tasks, and grocery shopping, etc., on her own.  He works with her 18 hours a week.  He noted that she tends to misplace things although this seems to be improving somewhat.  She denied difficulty remembering what others have said, and has not noticed problems with attention or concentration or decision-making.    Ms. Amin lives in an apartment by herself.  Her worker noted that she has a housing  who helps her with her bills, although she will be moving to another apartment at some point.  For the most part, she is managing her medications independently, with some verbal reminders.  She has not driven in years that she has no car.  She stated that she prepares her own meals without difficulty and handles her personal cares independently.    Ms. Amin reported a history of substance abuse, with heroin as her drug of choice.  She was in chemical dependency treatment program several times, and stated that she last used any heroin 4 to 5 years ago.  She drinks beer, estimating that she has 2 cans every other day.  Occasionally she will have more than that, but she stated that she never has 6 or more drinks in 1 occasion.  She smokes half a pack of cigarettes a day.    Ms. Amin reported a history of  "depression.  She stated that she is not currently working with a therapist.  Her worker noted that she has a referral for a chemical dependency counselor to come to her house, on behalf of her .  When asked to describe her mood, she stated that she is feeling all right with everything.  She gets depressed about things at times but does not feel that she is depressed overall.  She does not tend to worry about things.  She has not been crying more than normal.  She sleeps fairly well, through the night.  She does not know how many hours she is sleeping.  She will occasionally nap.  Her energy level is fair.  Her appetite is good and she has not had any recent weight changes.  Her interest level is fair.  She stated that she does not know what she will do from day-to-day.  She sees her family a lot.  She spends her days watching television and taking care of business that she has.  She denied suicidal ideation or any history of suicide attempts.  She denied visual or auditory hallucinations.    Ms. Amin was born and raised in [place], [place].  She had trouble with math in school but was never in any special education classes, and in fact math is her favorite subject.  She was never held back any grades.  She left school at the age of 19 when she , and at some point after that earned her GED.  She stated that she has done \"all kinds\" of work, including working at [employer].  She cannot remember when she last worked, and stated that she receives disability.  She was  at the age of 19 and was  at some point, and her  has since .  She has 4 children, and one son who  of crib death.  She has grandchildren as well, but was not able to state how many.    When asked about a history of head injury resulting in loss of consciousness, Ms. Amin stated that she may have but does not remember.  She thinks she has \"probably\" had seizures.  She does not think she has had a " stroke.  She stated that her balance has been good and she has not been falling.  She denied unilateral weakness or numbness.  She has not had tremor.  She has not been bothered by headaches recently.  She has arthritis and has had knee and hip surgeries.    Past Medical History  Past Medical History:   Diagnosis Date     Chronic hepatitis C (H)      COPD (chronic obstructive pulmonary disease) (H)      Generalized osteoarthrosis, unspecified site      HTN (hypertension)        Current Medications    She has a current medication list which includes the following prescription(s): acetaminophen, albuterol, allopurinol, diclofenac, losartan, methadone, order for dme, and polyethylene glycol.      Family History  Family History   Problem Relation Age of Onset     Diabetes No family hx of      Cancer No family hx of      Heart Disease No family hx of      Coronary Artery Disease No family hx of      Hypertension No family hx of      Hyperlipidemia No family hx of      Cerebrovascular Disease No family hx of      Breast Cancer No family hx of      Colon Cancer No family hx of      Prostate Cancer No family hx of      Other Cancer No family hx of      Depression No family hx of      Anxiety Disorder No family hx of      Mental Illness No family hx of      Substance Abuse No family hx of      Anesthesia Reaction No family hx of      Asthma No family hx of      Osteoporosis No family hx of      Genetic Disorder No family hx of      Thyroid Disease No family hx of      Obesity No family hx of      Unknown/Adopted No family hx of      Cerebrovascular Disease Sister      Anuerysm Sister        BEHAVIORAL OBSERVATIONS    During the evaluation, Ms. Amin was friendly but appeared frustrated during testing.  She was a vague historian.  She initially had a procedure scheduled later in the day of this evaluation.  She had been instructed not to eat, drink liquids, or smoke before the procedure, but did not recall this information  until her worker reminded her.  Ultimately, she decided to reschedule that procedure because she did not want to do this evaluation without being allowed to take breaks for smoking.  She appeared resistant to guessing responses on tests.  She had difficulty comprehending instructions for more complex tasks.  Finally, after about an hour and 20 minutes of testing, she stated that she could no longer think and asked to return to finish another time, stating that she was willing to reschedule.  Review of her test results indicate that sufficient testing was completed so there is no need for her to return.      She was alert and oriented to person, but not to place or time.  She gave her age as 71 rather than 72.  She stated that the year was 1960 or 1970 and that the month was November.  She stated that she was in [place].  She could not name the current  or the president before him.  She brought reading glasses with her, which she used as needed.  Gait was slow.  No tremors were observed clinically.  Speech was fluent, with normal articulation, volume, and rate.  Despite her need to discontinue testing, effort appeared to be adequate, and the results of this limited evaluation are believed to accurately reflect her current level of functioning.    MEASURES ADMINISTERED    The following measures were administered by a trained psychometrist, under the direct supervision of a licensed psychologist.    Digit Span subtest of the Wechsler Adult Intelligence Scale - 4; Reading subtest of the Wide Range Achievement Test - 4; subtests of the Wechsler Memory Scale - 4; Trail Making Test; Stroop; Dementia Rating Scale - 2 (DRS-2).    RESULTS AND INTERPRETATION    Premorbid intellectual functioning was estimated to fall in the low average range based on single word reading abilities.    Performance on a screening measure of dementia was exceptionally low (DRS-2 Total Score = 100/144).  On this  measure, she had particular difficulty on tasks of initiation/perseveration and memory.  Fluency tasks were notable for a paucity of responses that were primarily perseverative.      Attention span was exceptionally low for her age.  Divided attention was exceptionally low.  She was unable to complete the sample items on a visual motor sequencing task, with difficulty shifting cognitive set.  Performance on a measure of distractibility was exceptionally low.    Immediate recall of verbal narrative material was exceptionally low, with exceptionally low recall following a 30-minute delay.  Recognition memory on this task was not better than free recall.  Immediate and 30-minute delayed recall of visual material was exceptionally low.     JANELL Amin is a 72 year old woman with at least a one to five year history of memory decline which affects her ability to function independently.  She also has a history of depression, substance abuse, hepatitis C, and COPD.  At end of July family medicine clinic visit, a MoCA was 10/30.    Results of today's evaluation were limited due to her difficulty tolerating testing. Before the evaluation was completed, she indicated that she needed to leave, although she also noted that she would be happy to return at a different time if needed.  While the battery was limited, it was not deemed necessary to have her return.    Results of this limited evaluation suggest relatively global cognitive impairments, most notably in learning and memory, complex attention, and executive functioning.  Language and visual processing were not thoroughly assessed.  Taken together with her history, however, these findings suggest mild to moderate dementia.  Due to both the limited nature of the evaluation and the significant impairment across cognitive domains that were assessed, the etiology of her cognitive difficulties is not entirely clear based on neuropsychological evaluation  alone.  A neurodegenerative etiology, such as Alzheimer's disease, seems likely.  She has a longstanding history of depression which could exacerbate her cognitive difficulties but does not appear to be the sole contributor.    RECOMMENDATIONS    1) Ms. Amin's living situation is not entirely clear.  She and the worker who accompanied her to her appointment indicated that she lives in an apartment by herself. The worker assists with some of her cares, but by their report she is managing her medications independently with some verbal reminders, and she prepares her meals independently.  She did stop driving years ago and has no car.  Records from April that discuss her living situation note that she was living with her granddaughter, and social work was consulted to provide information regarding nursing homes.  She has limited insight into her cognitive difficulties.  If she is living alone, safety is of concern.  She will likely require at least minimal supervision for her safety.    2) In addition to general supervision, Ms. Amin is likely to require substantial assistance in ensuring that she is taking her medications correctly, including remembering to take all of the doses.  She may require assistance in meal preparation, including oversight if using a stove.    3) Given these concerns, a move to a nursing home or at the least to assisted living with substantial oversight is recommended. Her worker indicated that he is with her 18 hours a week, and that he was referred by her medical case manager, who may also be able to assist with identifying a living situation with greater oversight.     4) There is mention in a 4/13/2023 family medicine visit note that she reported no substance use, including no alcohol. Today, she indicated that she drinks two cans of beer every other day. As noted, she is a poor historian and it is unclear whether this is accurate. In any case, she is encouraged to refrain from  alcohol use given her cognitive difficulties and safety concerns.     5) In view of marked impairments in memory, as well as disorientation to time (she thought that it was 1960 or 1970), it may be helpful to place a calendar in a central location in her home, on which her family or caregivers could record upcoming appointments or events, and cross off each day as it passes.  She may benefit from written reminders and checklists, but may also need reminders to look at these.    6) Ms. Amin is likely to benefit from substantial structure and routine.  If she has difficulty managing large, complex tasks, others may assist by breaking down such tasks into smaller, more manageable parts.    7) If not already considered, she may benefit from referral to neurology for further evaluation and treatment recommendations.      Brit Curran, Ph.D., [facility]  Licensed Psychologist, [employer] [id number]  Board Certified in Clinical Neuropsychology      Time spent: One unit of professional time, including interview (CPT 22888). 60 minutes (1 unit) neuropsychological testing evaluation by licensed and board-certified neuropsychologist, including integration of patient data, interpretation of standardized test results and clinical data, clinical decision-making, treatment planning, report, and interactive feedback to the patient, first hour (CPT 21084). 109 minutes (2 units) of neuropsychological testing evaluation by licensed and board-certified neuropsychologist, including integration of patient data, interpretation of standardized test results and clinical data, clinical decision-making, treatment planning, report, and interactive feedback to the patient, subsequent hours (CPT 22099). 30 minutes of neuropsychological test administration and scoring by technician, first 30 minutes (CPT 56410). 106 additional minutes (4 units) neuropsychological test administration and scoring by technician, subsequent 30 minutes (CPT 48075). ICD-10  Diagnoses: F03.A18.        The patient was seen for neuropsychological evaluation at the request of Doe Jackson MD for the purposes of diagnostic clarification and treatment planning. 136 minutes of test administration and scoring were provided by this writer. Please see Dr. Brit Curran's report for a full interpretation of the findings.    Corinne Villegas  Psychometrist      Brit Curran, PhD LP

## 2023-08-15 NOTE — PROGRESS NOTES
The patient was seen for neuropsychological evaluation at the request of Doe Jackson MD for the purposes of diagnostic clarification and treatment planning. 136 minutes of test administration and scoring were provided by this writer. Please see Dr. Brit Curran's report for a full interpretation of the findings.    Corinne Villegas  Psychometrist

## 2023-08-15 NOTE — PROGRESS NOTES
NEUROPSYCHOLOGICAL EVALUATION    RELEVANT HISTORY AND REASON FOR REFERRAL    Demetra Amin is a 72 year old right handed, disabled woman with 11 years of formal education and a GED. Information was obtained via interview with the patient and her worker, and review of her medical records. Records indicate a history of cognitive impairment and likely dementia. She is described as distractible with limited short-term memory, worsening over the last year. She has lost her keys, wallet, and $5 bills.. At a 7/21/2023 family medicine visit, she was noted to have a MoCA of 10/30.  She also has a history of hepatitis C, and COPD. She was referred for neuropsychological evaluation by Guzman Jane M.D., for further characterization of any cognitive difficulties, and to evaluate mood and personality.     A family medicine visit note from 4/13/2023 was reviewed.  She presented to the clinic with her granddaughter, daughter, and son, due to to the patient and family being overwhelmed by cares at home, difficulty with follow-up, wanting to establish frequent follow-up visits, and get resources for further care.  She was noted to be medically complex with multiple emergency department visits in the last 2 months including difficulties with transportation to the methadone clinic.  It was noted that she was living with her granddaughter in a new living situation.  Regarding the memory loss, it was noted that she had previously been seen in January 2023 for this and at the time she reported that it has been going on for 5 or more years and was slowly worsening.  She would forget that she was cooking and had sprinklers go off.  She had gotten lost on the bus.  She was noted to continue with methadone with no other substances including including no alcohol.  On exam she was intermittently tearful, had flight of ideas, and was tangential.  A  was consulted for assistance with transportation and medical rides, and to  prepare resources to review at a future visit regarding nursing home options.  The January 12, 2023 note also mention multiple ED visits in the last 2 years with multiple care gaps.    CLINICAL INTERVIEW FINDINGS    Upon interview, Ms. Amin acknowledged that she has had progressive difficulty with cognition over the last 6 months or so.  Her worker stated that he has known her for 6 months after she was referred through her medical case manager, in order to help with day-to-day living such as doctors appointments and other follow-ups. She was apparently having greater difficulty accomplishing these tasks, and grocery shopping, etc., on her own.  He works with her 18 hours a week.  He noted that she tends to misplace things although this seems to be improving somewhat.  She denied difficulty remembering what others have said, and has not noticed problems with attention or concentration or decision-making.    Ms. Amin lives in an apartment by herself.  Her worker noted that she has a housing  who helps her with her bills, although she will be moving to another apartment at some point.  For the most part, she is managing her medications independently, with some verbal reminders.  She has not driven in years that she has no car.  She stated that she prepares her own meals without difficulty and handles her personal cares independently.    Ms. Amin reported a history of substance abuse, with heroin as her drug of choice.  She was in chemical dependency treatment program several times, and stated that she last used any heroin 4 to 5 years ago.  She drinks beer, estimating that she has 2 cans every other day.  Occasionally she will have more than that, but she stated that she never has 6 or more drinks in 1 occasion.  She smokes half a pack of cigarettes a day.    Ms. Amin reported a history of depression.  She stated that she is not currently working with a therapist.  Her worker noted that  "she has a referral for a chemical dependency counselor to come to her house, on behalf of her .  When asked to describe her mood, she stated that she is feeling all right with everything.  She gets depressed about things at times but does not feel that she is depressed overall.  She does not tend to worry about things.  She has not been crying more than normal.  She sleeps fairly well, through the night.  She does not know how many hours she is sleeping.  She will occasionally nap.  Her energy level is fair.  Her appetite is good and she has not had any recent weight changes.  Her interest level is fair.  She stated that she does not know what she will do from day-to-day.  She sees her family a lot.  She spends her days watching television and taking care of business that she has.  She denied suicidal ideation or any history of suicide attempts.  She denied visual or auditory hallucinations.    Ms. Amin was born and raised in [place], [place].  She had trouble with math in school but was never in any special education classes, and in fact math is her favorite subject.  She was never held back any grades.  She left school at the age of 19 when she , and at some point after that earned her GED.  She stated that she has done \"all kinds\" of work, including working at [employer].  She cannot remember when she last worked, and stated that she receives disability.  She was  at the age of 19 and was  at some point, and her  has since .  She has 4 children, and one son who  of crib death.  She has grandchildren as well, but was not able to state how many.    When asked about a history of head injury resulting in loss of consciousness, Ms. Amin stated that she may have but does not remember.  She thinks she has \"probably\" had seizures.  She does not think she has had a stroke.  She stated that her balance has been good and she has not been falling.  She denied unilateral " weakness or numbness.  She has not had tremor.  She has not been bothered by headaches recently.  She has arthritis and has had knee and hip surgeries.    Past Medical History  Past Medical History:   Diagnosis Date    Chronic hepatitis C (H)     COPD (chronic obstructive pulmonary disease) (H)     Generalized osteoarthrosis, unspecified site     HTN (hypertension)        Current Medications    She has a current medication list which includes the following prescription(s): acetaminophen, albuterol, allopurinol, diclofenac, losartan, methadone, order for dme, and polyethylene glycol.      Family History  Family History   Problem Relation Age of Onset    Diabetes No family hx of     Cancer No family hx of     Heart Disease No family hx of     Coronary Artery Disease No family hx of     Hypertension No family hx of     Hyperlipidemia No family hx of     Cerebrovascular Disease No family hx of     Breast Cancer No family hx of     Colon Cancer No family hx of     Prostate Cancer No family hx of     Other Cancer No family hx of     Depression No family hx of     Anxiety Disorder No family hx of     Mental Illness No family hx of     Substance Abuse No family hx of     Anesthesia Reaction No family hx of     Asthma No family hx of     Osteoporosis No family hx of     Genetic Disorder No family hx of     Thyroid Disease No family hx of     Obesity No family hx of     Unknown/Adopted No family hx of     Cerebrovascular Disease Sister     Anuerysm Sister        BEHAVIORAL OBSERVATIONS    During the evaluation, Ms. Amin was friendly but appeared frustrated during testing.  She was a vague historian.  She initially had a procedure scheduled later in the day of this evaluation.  She had been instructed not to eat, drink liquids, or smoke before the procedure, but did not recall this information until her worker reminded her.  Ultimately, she decided to reschedule that procedure because she did not want to do this evaluation  without being allowed to take breaks for smoking.  She appeared resistant to guessing responses on tests.  She had difficulty comprehending instructions for more complex tasks.  Finally, after about an hour and 20 minutes of testing, she stated that she could no longer think and asked to return to finish another time, stating that she was willing to reschedule.  Review of her test results indicate that sufficient testing was completed so there is no need for her to return.      She was alert and oriented to person, but not to place or time.  She gave her age as 71 rather than 72.  She stated that the year was 1960 or 1970 and that the month was November.  She stated that she was in [place].  She could not name the current  or the president before him.  She brought reading glasses with her, which she used as needed.  Gait was slow.  No tremors were observed clinically.  Speech was fluent, with normal articulation, volume, and rate.  Despite her need to discontinue testing, effort appeared to be adequate, and the results of this limited evaluation are believed to accurately reflect her current level of functioning.    MEASURES ADMINISTERED    The following measures were administered by a trained psychometrist, under the direct supervision of a licensed psychologist.    Digit Span subtest of the Wechsler Adult Intelligence Scale - 4; Reading subtest of the Wide Range Achievement Test - 4; subtests of the Wechsler Memory Scale - 4; Trail Making Test; Stroop; Dementia Rating Scale - 2 (DRS-2).    RESULTS AND INTERPRETATION    Premorbid intellectual functioning was estimated to fall in the low average range based on single word reading abilities.    Performance on a screening measure of dementia was exceptionally low (DRS-2 Total Score = 100/144).  On this measure, she had particular difficulty on tasks of initiation/perseveration and memory.  Fluency tasks were notable for a paucity of  responses that were primarily perseverative.      Attention span was exceptionally low for her age.  Divided attention was exceptionally low.  She was unable to complete the sample items on a visual motor sequencing task, with difficulty shifting cognitive set.  Performance on a measure of distractibility was exceptionally low.    Immediate recall of verbal narrative material was exceptionally low, with exceptionally low recall following a 30-minute delay.  Recognition memory on this task was not better than free recall.  Immediate and 30-minute delayed recall of visual material was exceptionally low.     IMPRESSIONS    Demetra Amin is a 72 year old woman with at least a one to five year history of memory decline which affects her ability to function independently.  She also has a history of depression, substance abuse, hepatitis C, and COPD.  At end of July [facility] medicine clinic visit, a MoCA was 10/30.    Results of today's evaluation were limited due to her difficulty tolerating testing. Before the evaluation was completed, she indicated that she needed to leave, although she also noted that she would be happy to return at a different time if needed.  While the battery was limited, it was not deemed necessary to have her return.    Results of this limited evaluation suggest relatively global cognitive impairments, most notably in learning and memory, complex attention, and executive functioning.  Language and visual processing were not thoroughly assessed.  Taken together with her history, however, these findings suggest mild to moderate dementia.  Due to both the limited nature of the evaluation and the significant impairment across cognitive domains that were assessed, the etiology of her cognitive difficulties is not entirely clear based on neuropsychological evaluation alone.  A neurodegenerative etiology, such as Alzheimer's disease, seems likely.  She has a longstanding history of depression which  could exacerbate her cognitive difficulties but does not appear to be the sole contributor.    RECOMMENDATIONS    1) Ms. Amin's living situation is not entirely clear.  She and the worker who accompanied her to her appointment indicated that she lives in an apartment by herself. The worker assists with some of her cares, but by their report she is managing her medications independently with some verbal reminders, and she prepares her meals independently.  She did stop driving years ago and has no car.  Records from April that discuss her living situation note that she was living with her granddaughter, and social work was consulted to provide information regarding nursing homes.  She has limited insight into her cognitive difficulties.  If she is living alone, safety is of concern.  She will likely require at least minimal supervision for her safety.    2) In addition to general supervision, Ms. Amin is likely to require substantial assistance in ensuring that she is taking her medications correctly, including remembering to take all of the doses.  She may require assistance in meal preparation, including oversight if using a stove.    3) Given these concerns, a move to a nursing home or at the least to assisted living with substantial oversight is recommended. Her worker indicated that he is with her 18 hours a week, and that he was referred by her medical case manager, who may also be able to assist with identifying a living situation with greater oversight.     4) There is mention in a 4/13/2023 family medicine visit note that she reported no substance use, including no alcohol. Today, she indicated that she drinks two cans of beer every other day. As noted, she is a poor historian and it is unclear whether this is accurate. In any case, she is encouraged to refrain from alcohol use given her cognitive difficulties and safety concerns.     5) In view of marked impairments in memory, as well as  disorientation to time (she thought that it was 1960 or 1970), it may be helpful to place a calendar in a central location in her home, on which her family or caregivers could record upcoming appointments or events, and cross off each day as it passes.  She may benefit from written reminders and checklists, but may also need reminders to look at these.    6) Ms. Amin is likely to benefit from substantial structure and routine.  If she has difficulty managing large, complex tasks, others may assist by breaking down such tasks into smaller, more manageable parts.    7) If not already considered, she may benefit from referral to neurology for further evaluation and treatment recommendations.      Brit Curran, Ph.D., ABPP  Licensed Psychologist, [id number]  Board Certified in Clinical Neuropsychology      Time spent: One unit of professional time, including interview (CPT 81550). 60 minutes (1 unit) neuropsychological testing evaluation by licensed and board-certified neuropsychologist, including integration of patient data, interpretation of standardized test results and clinical data, clinical decision-making, treatment planning, report, and interactive feedback to the patient, first hour (CPT 75330). 109 minutes (2 units) of neuropsychological testing evaluation by licensed and board-certified neuropsychologist, including integration of patient data, interpretation of standardized test results and clinical data, clinical decision-making, treatment planning, report, and interactive feedback to the patient, subsequent hours (CPT 25981). 30 minutes of neuropsychological test administration and scoring by technician, first 30 minutes (CPT 03123). 106 additional minutes (4 units) neuropsychological test administration and scoring by technician, subsequent 30 minutes (CPT 10831). ICD-10 Diagnoses: F03.A18.

## 2023-08-23 NOTE — CONFIDENTIAL NOTE
NAME  Demetra Amin     MRN  [id number]      52     AGE  72     SEX  Female     HANDEDNESS 0     EDUCATION  0     JOY  8/15/23     PROVIDER  [facility]  [place]     STATION  OP            WMS-R       Orientation  5             WMS-IV LOGICAL MEMORY 0    Raw ScS/%ile     LM I 13 4     LM II 1 2     LM Rec. 12 <2     VR I  9 1     VR II 0 1     VR Rec. 2 0            TRAIL MAKING TEST       Time Errors MAS %ile   A 60 1 6    B d/c 0 0           STROOP        Raw MAS T    Word 89 11 0    Color 32 4 0    C/W 7 1 0    Interference -15  35           WRAT    5     SS %ile GE   Word Reading 82 12 6.6          WAIS-IV         Raw ScS    Block Design 0 0    Vocabulary  0 0    Digit Span  13 3            DEMENTIA RATING SCALE - 2 Standard     Raw MAS    Attention  36 11    Initiation/Persev. 21 2    Construction 6 10    Concept  29 5    Memory  8 2    Total /144  100 2

## 2023-09-08 NOTE — RESULT ENCOUNTER NOTE
Please go ahead and get your special liver test, the FibroScan, and then follow-up with me in the clinic after that.  Your bone density was okay but we need to make sure you are getting enough calcium and vitamin D.

## 2023-09-08 NOTE — LETTER
September 14, 2023      Demetra Amin  [address]   [place] [address]        Dear ,    We are writing to inform you of your test results.    Please go ahead and get your special liver test, the FibroScan, and then follow-up with me in the clinic after that.  Your bone density was okay but we need to make sure you are getting enough calcium and vitamin D.     Resulted Orders   DEXA HIP/PELVIS/SPINE - Future    Narrative    EXAM: DX WRIST/HEEL/RADIUS, DX HIP/PELVIS/SPINE  LOCATION: [facility]  DATE: 9/8/2023    INDICATION: BMD screening. Follow-up. Current tobacco use. Rheumatoid arthritis.  DEMOGRAPHICS: Age- 72 years. Gender- Female. Menopausal status- Postmenopausal.  COMPARISON: 04/06/2012  TECHNIQUE: Dual-energy x-ray absorptiometry (DXA) performed with routine technique. Forearm DXA performed since the hips could not be measured or interpreted.  Trabecular bone score (TBS) analysis performed.    FINDINGS:    DXA RESULTS  -Lumbar Spine: L1-L4: BMD: 1.170 g/cm2. T-score: -0.1. Z-score: 0.9. Degenerative change may artifactually increase BMD.  -LEFT Radius 33%: BMD: 0.568 g/cm2. T-score: -1.9. Z-score: -0.8.    WHO T-SCORE CRITERIA  -Normal: T score at or above -1 SD  -Osteopenia: T score between -1 and -2.5 SD  -Osteoporosis: T score at or below -2.5 SD    The World Health Organization (WHO) criteria is applicable to perimenopausal females, postmenopausal females, and men aged 50 years or older.    TBS RESULTS  -Lumbar Spine L1-L4: TBS: 0.913. TBS T-score: -4.6.TBS Z-score: -2.8.    The TBS is a DXA derived measurement for fracture risk assessment, and reflects the structural condition of the bone microarchitecture. It can be used to adjust WHO Fracture Risk Assessment Tool (FRAX) probability of fracture in postmenopausal women and   older men. The calculated probabilities of fracture have been shown to be more accurate when computed with the TBS.    INTERVAL  CHANGE  -There has been a 10.6% decrease in lumbar spine BMD.   There is no previous measurement documented for the left forearm.    FRACTURE RISK  -The FRAX risk calculator is not applicable due to lack of evaluable hip.      Impression    IMPRESSION: Low bone density (OSTEOPENIA). T score meets the WHO criteria for low bone density (osteopenia) at one or more measured sites. The risk of osteoporotic fracture increases approximately two-fold for each standard deviation decrease in T-score.       If you have any questions or concerns, please call the clinic at the number listed above.       Sincerely,      Guzman Jane MD

## 2023-09-08 NOTE — LETTER
September 14, 2023      Demetraastrid Amin  [address]   [place] [address]        Dear ,    We are writing to inform you of your test results.    Your mammogram was normal.  Let us know if you have any questions or concerns about that.     Resulted Orders   MA SCREENING DIGITAL BILAT - Future  (s+30)    Narrative    BILATERAL FULL FIELD DIGITAL SCREENING MAMMOGRAM    Performed on: 9/8/23    Compared to: 03/14/2018, 10/16/2015, 07/01/2013, and 05/01/2013    Technique: This study was evaluated with the assistance of Computer-Aided   Detection.    Findings: The breasts are heterogeneously dense, which may obscure small   masses.  There is no radiographic evidence of malignancy.     Impression    IMPRESSION: ACR BI-RADS Category 1: Negative    RECOMMENDED FOLLOW-UP: Annual routine screening mammogram    The results and recommendations of this examination will be communicated   to the patient.        Katerina Velazquez MD             If you have any questions or concerns, please call the clinic at the number listed above.       Sincerely,      Guzman Jane MD             no respiratory distress/no photosensitivity/no urticaria/no anaphylaxis

## 2023-09-08 NOTE — LETTER
September 14, 2023      Demetra Amin  [address]   [place] [address]        Dear ,    We are writing to inform you of your test results.    These results are within the normal range for you.  Please follow up in the clinic as directed.     Resulted Orders   US ABDOMEN LIMITED    Narrative    EXAM: US ABDOMEN LIMITED  LOCATION: [facility]  DATE: 9/8/2023    INDICATION:  Chronic hepatitis C without hepatic coma (H)  COMPARISON: 03/14/2018  TECHNIQUE: Limited abdominal ultrasound.    FINDINGS:    GALLBLADDER: 7 mm gallstone. Otherwise normal.    BILE DUCTS: No biliary dilatation. The common duct measures 7 mm.    LIVER: Normal parenchyma with smooth contour. No focal mass. Dilated portal vein measures 1.6 cm in diameter, but retains normal antegrade blood flow.    RIGHT KIDNEY: No hydronephrosis.    PANCREAS: Normal where seen, though the tip of the tail is obscured by bowel gas.    Trace ascites. Normal spleen size.    1.8 cm feli hepatic lymph node.      Impression    IMPRESSION:  1.  Morphologically normal liver with no visible fibrosis, cirrhosis, or focal mass  2.  Evidence of portal venous hypertension, including portal vein dilation and trace  3.  Enlarged feli hepatic lymph node is likely reactive.           If you have any questions or concerns, please call the clinic at the number listed above.       Sincerely,      Guzman Jane MD

## 2023-09-18 NOTE — TELEPHONE ENCOUNTER
[facility] Medicine Clinic phone call message- general phone call:    Reason for call:     Housing case lourdes Rushing is going to fax over some paper work. Kristan would like someone to call her if patient need an appt.     Return call needed: Yes    OK to leave a message on voice mail? Yes    Primary language: English      needed? No    Call taken on September 18, 2023 at 3:17 PM by Naheed Jones

## 2023-09-18 NOTE — TELEPHONE ENCOUNTER
Katheryn calling with [employer] to see who her PCP is on file. I sent Katheryn over to the clinic that is listed as Primary clinic to get further assistance because I was confused on the doctor that is listed and what it says next to it.

## 2023-09-20 NOTE — TELEPHONE ENCOUNTER
Appt made for 10/10 @920 am with Dr. Chacko for the forms to be filled out- Nimisha SÁNCHEZ has the form/ mvy
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
## 2023-10-10 NOTE — LETTER
October 11, 2023      Demetra LEAHY [facility]  [address]   [place] [address]        Geo Mccrary,     Your COVID, RSV, and influenza testing are negative. It is likely you have a small viral infection or seasonal allergies. This should resolve over the next few weeks.     Please come back to clinic with family to discuss further cares of yourself.       Dr. Chacko     Resulted Orders   Symptomatic Influenza A/B, RSV, & SARS-CoV2 PCR (COVID-19) Nose   Result Value Ref Range    Influenza A PCR Negative Negative    Influenza B PCR Negative Negative    RSV PCR Negative Negative    SARS CoV2 PCR Negative Negative      Comment:      NEGATIVE: SARS-CoV-2 (COVID-19) RNA not detected, presumed negative.    Narrative    Testing was performed using the Xpert Xpress CoV2/Flu/RSV Assay on the [employer] GeneXpert Instrument. This test should be ordered for the detection of SARS-CoV-2, influenza, and RSV viruses in individuals who meet clinical and/or epidemiological criteria. Test performance is unknown in asymptomatic patients. This test is for in vitro diagnostic use under the FDA EUA for laboratories certified under CLIA to perform high or moderate complexity testing. This test has not been FDA cleared or approved. A negative result does not rule out the presence of PCR inhibitors in the specimen or target RNA in concentration below the limit of detection for the assay. If only one viral target is positive but coinfection with multiple targets is suspected, the sample should be re-tested with another FDA cleared, approved, or authorized test, if coinfection would change clinical management. This test was validated by the [facility]. These laboratories are certified under the Clinical Laboratory Improvement Amendments of 1988 (CLIA-88) as qualified to perform high complexity laboratory testing.

## 2023-10-10 NOTE — PROGRESS NOTES
Preceptor Attestation:    I discussed the patient with the resident and evaluated the patient in person. I have verified the content of the note, which accurately reflects my assessment of the patient and the plan of care.   Supervising Physician:  Jose Marcelino MD.    TSH and B12 normal  EXAM: MR BRAIN W/O CONTRAST  LOCATION: [facility]  DATE: 9/8/2023     INDICATION:  Memory loss, Cognitive impairment  COMPARISON: None.  TECHNIQUE: Routine multiplanar multisequence head MRI without intravenous contrast.     FINDINGS:  INTRACRANIAL CONTENTS: No acute or subacute infarct. No mass, acute hemorrhage, or extra-axial fluid collections. Patchy and confluent nonspecific T2/FLAIR hyperintensities within the cerebral white matter and vel most consistent with moderate to severe   chronic microvascular ischemic change. Mild to moderate generalized cerebral atrophy. No hydrocephalus. Normal position of the cerebellar tonsils.      SELLA: No abnormality accounting for technique.     OSSEOUS STRUCTURES/SOFT TISSUES: Normal marrow signal. The major intracranial vascular flow voids are maintained.      ORBITS: No abnormality accounting for technique.      SINUSES/MASTOIDS: No paranasal sinus mucosal disease. No middle ear or mastoid effusion.                                                                       IMPRESSION:  1.  No acute or subacute ischemic change.  2.  Moderate to severe sequela of chronic small vessel ischemic change, significantly progressed.  3.  Mild to moderate generalized volume loss, mildly progressed.

## 2023-10-20 NOTE — PROGRESS NOTES
[facility] GERIATRICS    PRIMARY CARE PROVIDER AND CLINIC:  Mariah Chacko MD, [address] [place] [id number]  Chief Complaint   Patient presents with    Hospital F/U      [facility] Medical Record Number:  [id number]  Place of Service where encounter took place:  [facility] ([facility]) [[id number]]    Demetra Amin  is a 72 year old  (1950), admitted to the above facility from  [facility] . Hospital stay 10/16/23 through 10/19/23. Patient with PMH COPD, chronic pain, memory loss, HTN, and RA was seen in the ED 10/10/23, diagnosed with pneumonia, and declined admission. She came back on 10/16/23 with worsening SOB. CXR worsening. She was also found to have a large left perihilar mass with invasion of the mediastinum. The mass obstructs the left mainstem bronchus. There was also a new right adrenal nodule 2.2cm, which was felt to be likely metastasis. They were not able to schedule a bronchoscopy and biopsy until 10/24/23.     HPI obtained from patient visit, review of nursing home record, discussion with facility staff, and Epic review.     HPI:    Patient is aware of her probable diagnosis and procedure next week. [facility] was able to set up transportation for her, they have the orders they need. The patient does not have any acute concerns. She is feeling down about this diagnosis. Provider advises her to consider what treatment she would want if it were offered.     CODE STATUS/ADVANCE DIRECTIVES DISCUSSION:  CPR/Full code   ALLERGIES:   Allergies   Allergen Reactions    Nkda [No Known Drug Allergy]       PAST MEDICAL HISTORY:   Past Medical History:   Diagnosis Date    Chronic hepatitis C (H)     COPD (chronic obstructive pulmonary disease) (H)     Generalized osteoarthrosis, unspecified site     HTN (hypertension)       PAST SURGICAL HISTORY:   has a past surgical history that includes TOTAL KNEE ARTHROPLASTY; TOTAL HIP ARTHROPLASTY; Eye surgery; joint replacement (Bilateral); Arthroplasty  hip (Left); Arthroplasty knee (Left, 2013); Arthroscopy knee (Right, 2013); and Picc (1/20/2015).  FAMILY HISTORY: family history includes Anuerysm in her sister; Cerebrovascular Disease in her sister.  SOCIAL HISTORY:   reports that she has been smoking cigarettes. She has been smoking an average of .5 packs per day. She has never used smokeless tobacco. She reports current alcohol use. She reports that she does not use drugs.  Patient's living condition: lives alone    Post Discharge Medication Reconciliation Status:   MED REC REQUIRED  Post Medication Reconciliation Status:  Discharge medications reconciled, continue medications without change       Current Outpatient Medications   Medication Sig    albuterol (PROAIR HFA/PROVENTIL HFA/VENTOLIN HFA) 108 (90 Base) MCG/ACT inhaler Inhale 2 puffs into the lungs every 6 hours as needed for shortness of breath or wheezing    allopurinol (ZYLOPRIM) 100 MG tablet Take 1 tablet (100 mg) by mouth daily    amoxicillin-clavulanate (AUGMENTIN) 875-125 MG tablet Take 1 tablet by mouth every 12 hours as needed    diclofenac (VOLTAREN) 1 % topical gel Apply 4 g topically 4 times daily as needed for moderate pain    fluticasone-salmeterol (ADVAIR-HFA) 45-21 MCG/ACT inhaler Inhale 2 puffs into the lungs 2 times daily    ipratropium - albuterol 0.5 mg/2.5 mg/3 mL (DUONEB) 0.5-2.5 (3) MG/3ML neb solution Take 1 vial by nebulization every 6 hours as needed for shortness of breath, wheezing or cough    losartan (COZAAR) 50 MG tablet Take 1 tablet (50 mg) by mouth daily    nicotine (NICODERM CQ) 21 MG/24HR 24 hr patch Place 1 patch onto the skin every 24 hours    polyethylene glycol (MIRALAX) 17 GM/Dose powder Take 17 g (1 Capful) by mouth 2 times daily    methadone (DOLOPHINE-INTENSOL) 10 MG/ML (HIGH CONC) solution Take 5.3 mLs (53 mg) by mouth daily     No current facility-administered medications for this visit.       ROS:  4 point ROS including Respiratory, CV, GI and , other  "than that noted in the HPI,  is negative    Vitals:  BP (!) 149/80   Pulse 80   Temp 97.4  F (36.3  C)   Resp 18   Ht 1.626 m (5' 4\")   Wt 59.9 kg (132 lb)   SpO2 90%   BMI 22.66 kg/m    Exam:  GENERAL APPEARANCE:  Alert, in no distress, thin  EYES:  EOM normal, conjunctiva and lids normal  RESP:  no respiratory distress, diminished breath sounds throughout  CV:  no edema  PSYCH:  oriented X 3, sad    Lab/Diagnostic data:  Recent labs in [facility] reviewed by me today.     ASSESSMENT/PLAN:  (R91.8) Lung mass  (primary encounter diagnosis)  Comment: Most likely malignant. She appears very thin and weak, she may not be a candidate for treatment. She is also high risk for worsening respiratory status, rehospitalization  Plan: Proceed with biopsy as planned. Follow up with pulmonary/oncology    (R53.81) Physical deconditioning  Comment: Patient may have a difficult time improving due to cancer and the mass restricting her airway. It seems unlikely she will be safe to return to independent living  Plan: PT/OT eval and treat, discharge planning per their recommendations.    (R41.89) Cognitive impairment  Comment: Patient had neuropsych testing in August that was consistent with dementia, probably Alzheimer's with moderate impairment. This will also affect her discharge planning  Plan: OT eval and treat    (G89.4) Chronic pain syndrome  Comment: Chronic condition being managed with medications.  Plan: Continue current POC with no changes at this time and adjustments as needed.    (I10) Benign essential hypertension  Comment: Chronic, fair control  Plan: Continue current POC with no changes at this time and adjustments as needed.        Electronically signed by:  LUIS Berrios CNP                "

## 2023-10-20 NOTE — LETTER
10/20/2023        RE: Demetra Amin  [address]  [place] [address]        [facility] GERIATRICS    PRIMARY CARE PROVIDER AND CLINIC:  Mariah Chacko MD, [address] [facility] [address]  Chief Complaint   Patient presents with     Hospital F/U      [place] [facility] Medical Record Number:  [id number]  Place of Service where encounter took place:  [facility] ([facility]) [[id number]]    Demetra Amin  is a 72 year old  (1950), admitted to the above facility from  [facility] . Hospital stay 10/16/23 through 10/19/23. Patient with PMH COPD, chronic pain, memory loss, HTN, and RA was seen in the ED 10/10/23, diagnosed with pneumonia, and declined admission. She came back on 10/16/23 with worsening SOB. CXR worsening. She was also found to have a large left perihilar mass with invasion of the mediastinum. The mass obstructs the left mainstem bronchus. There was also a new right adrenal nodule 2.2cm, which was felt to be likely metastasis. They were not able to schedule a bronchoscopy and biopsy until 10/24/23.     HPI obtained from patient visit, review of nursing home record, discussion with facility staff, and Epic review.     HPI:    Patient is aware of her probable diagnosis and procedure next week. [facility] for her, they have the orders they need. The patient does not have any acute concerns. She is feeling down about this diagnosis. Provider advises her to consider what treatment she would want if it were offered.     CODE STATUS/ADVANCE DIRECTIVES DISCUSSION:  CPR/Full code   ALLERGIES:   Allergies   Allergen Reactions     Nkda [No Known Drug Allergy]       PAST MEDICAL HISTORY:   Past Medical History:   Diagnosis Date     Chronic hepatitis C (H)      COPD (chronic obstructive pulmonary disease) (H)      Generalized osteoarthrosis, unspecified site      HTN (hypertension)       PAST SURGICAL HISTORY:   has a past surgical history that includes  TOTAL KNEE ARTHROPLASTY; TOTAL HIP ARTHROPLASTY; Eye surgery; joint replacement (Bilateral); Arthroplasty hip (Left); Arthroplasty knee (Left, 2013); Arthroscopy knee (Right, 2013); and Picc (1/20/2015).  FAMILY HISTORY: family history includes Anuerysm in her sister; Cerebrovascular Disease in her sister.  SOCIAL HISTORY:   reports that she has been smoking cigarettes. She has been smoking an average of .5 packs per day. She has never used smokeless tobacco. She reports current alcohol use. She reports that she does not use drugs.  Patient's living condition: lives alone    Post Discharge Medication Reconciliation Status:   MED REC REQUIRED  Post Medication Reconciliation Status:  Discharge medications reconciled, continue medications without change       Current Outpatient Medications   Medication Sig     albuterol (PROAIR HFA/PROVENTIL HFA/VENTOLIN HFA) 108 (90 Base) MCG/ACT inhaler Inhale 2 puffs into the lungs every 6 hours as needed for shortness of breath or wheezing     allopurinol (ZYLOPRIM) 100 MG tablet Take 1 tablet (100 mg) by mouth daily     amoxicillin-clavulanate (AUGMENTIN) 875-125 MG tablet Take 1 tablet by mouth every 12 hours as needed     diclofenac (VOLTAREN) 1 % topical gel Apply 4 g topically 4 times daily as needed for moderate pain     fluticasone-salmeterol (ADVAIR-HFA) 45-21 MCG/ACT inhaler Inhale 2 puffs into the lungs 2 times daily     ipratropium - albuterol 0.5 mg/2.5 mg/3 mL (DUONEB) 0.5-2.5 (3) MG/3ML neb solution Take 1 vial by nebulization every 6 hours as needed for shortness of breath, wheezing or cough     losartan (COZAAR) 50 MG tablet Take 1 tablet (50 mg) by mouth daily     nicotine (NICODERM CQ) 21 MG/24HR 24 hr patch Place 1 patch onto the skin every 24 hours     polyethylene glycol (MIRALAX) 17 GM/Dose powder Take 17 g (1 Capful) by mouth 2 times daily     methadone (DOLOPHINE-INTENSOL) 10 MG/ML (HIGH CONC) solution Take 5.3 mLs (53 mg) by mouth daily     No current  "facility-administered medications for this visit.       ROS:  4 point ROS including Respiratory, CV, GI and , other than that noted in the HPI,  is negative    Vitals:  BP (!) 149/80   Pulse 80   Temp 97.4  F (36.3  C)   Resp 18   Ht 1.626 m (5' 4\")   Wt 59.9 kg (132 lb)   SpO2 90%   BMI 22.66 kg/m    Exam:  GENERAL APPEARANCE:  Alert, in no distress, thin  EYES:  EOM normal, conjunctiva and lids normal  RESP:  no respiratory distress, diminished breath sounds throughout  CV:  no edema  PSYCH:  oriented X 3, sad    Lab/Diagnostic data:  Recent labs in [facility] reviewed by me today.     ASSESSMENT/PLAN:  (R91.8) Lung mass  (primary encounter diagnosis)  Comment: Most likely malignant. She appears very thin and weak, she may not be a candidate for treatment. She is also high risk for worsening respiratory status, rehospitalization  Plan: Proceed with biopsy as planned. Follow up with pulmonary/oncology    (R53.81) Physical deconditioning  Comment: Patient may have a difficult time improving due to cancer and the mass restricting her airway. It seems unlikely she will be safe to return to independent living  Plan: PT/OT eval and treat, discharge planning per their recommendations.    (R41.89) Cognitive impairment  Comment: Patient had neuropsych testing in August that was consistent with dementia, probably Alzheimer's with moderate impairment. This will also affect her discharge planning  Plan: OT eval and treat    (G89.4) Chronic pain syndrome  Comment: Chronic condition being managed with medications.  Plan: Continue current POC with no changes at this time and adjustments as needed.    (I10) Benign essential hypertension  Comment: Chronic, fair control  Plan: Continue current POC with no changes at this time and adjustments as needed.        Electronically signed by:  LUIS Berrios CNP                    Sincerely,        LUIS Berrios CNP      "